# Patient Record
Sex: FEMALE | Race: OTHER | Employment: PART TIME | URBAN - METROPOLITAN AREA
[De-identification: names, ages, dates, MRNs, and addresses within clinical notes are randomized per-mention and may not be internally consistent; named-entity substitution may affect disease eponyms.]

---

## 2018-04-14 ENCOUNTER — HOSPITAL ENCOUNTER (EMERGENCY)
Age: 43
Discharge: HOME | End: 2018-04-14
Attending: EMERGENCY MEDICINE
Payer: SELF-PAY

## 2018-04-14 ENCOUNTER — APPOINTMENT (OUTPATIENT)
Dept: GENERAL RADIOLOGY | Age: 43
End: 2018-04-14
Attending: NURSE PRACTITIONER
Payer: SELF-PAY

## 2018-04-14 ENCOUNTER — APPOINTMENT (OUTPATIENT)
Dept: CT IMAGING | Age: 43
End: 2018-04-14
Attending: PHYSICIAN ASSISTANT
Payer: SELF-PAY

## 2018-04-14 VITALS
RESPIRATION RATE: 11 BRPM | SYSTOLIC BLOOD PRESSURE: 125 MMHG | OXYGEN SATURATION: 99 % | WEIGHT: 189.6 LBS | HEIGHT: 67 IN | DIASTOLIC BLOOD PRESSURE: 82 MMHG | BODY MASS INDEX: 29.76 KG/M2 | HEART RATE: 73 BPM | TEMPERATURE: 97.8 F

## 2018-04-14 DIAGNOSIS — S46.912A STRAIN OF LEFT SHOULDER, INITIAL ENCOUNTER: ICD-10-CM

## 2018-04-14 DIAGNOSIS — N39.0 URINARY TRACT INFECTION WITHOUT HEMATURIA, SITE UNSPECIFIED: Primary | ICD-10-CM

## 2018-04-14 LAB
ANION GAP SERPL CALC-SCNC: 7 MMOL/L (ref 3–18)
APPEARANCE UR: CLEAR
BACTERIA URNS QL MICRO: ABNORMAL /HPF
BASOPHILS # BLD: 0 K/UL (ref 0–0.06)
BASOPHILS NFR BLD: 1 % (ref 0–2)
BILIRUB UR QL: NEGATIVE
BUN SERPL-MCNC: 10 MG/DL (ref 7–18)
BUN/CREAT SERPL: 13 (ref 12–20)
CALCIUM SERPL-MCNC: 7.7 MG/DL (ref 8.5–10.1)
CHLORIDE SERPL-SCNC: 111 MMOL/L (ref 100–108)
CK MB CFR SERPL CALC: NORMAL % (ref 0–4)
CK MB SERPL-MCNC: <1 NG/ML (ref 5–25)
CK SERPL-CCNC: 83 U/L (ref 26–192)
CO2 SERPL-SCNC: 26 MMOL/L (ref 21–32)
COLOR UR: YELLOW
CREAT SERPL-MCNC: 0.79 MG/DL (ref 0.6–1.3)
DIFFERENTIAL METHOD BLD: ABNORMAL
EOSINOPHIL # BLD: 0.1 K/UL (ref 0–0.4)
EOSINOPHIL NFR BLD: 2 % (ref 0–5)
EPITH CASTS URNS QL MICRO: ABNORMAL /LPF (ref 0–5)
ERYTHROCYTE [DISTWIDTH] IN BLOOD BY AUTOMATED COUNT: 14.4 % (ref 11.6–14.5)
GLUCOSE SERPL-MCNC: 87 MG/DL (ref 74–99)
GLUCOSE UR STRIP.AUTO-MCNC: NEGATIVE MG/DL
HCG UR QL: NEGATIVE
HCT VFR BLD AUTO: 37.4 % (ref 35–45)
HGB BLD-MCNC: 11.9 G/DL (ref 12–16)
HGB UR QL STRIP: ABNORMAL
KETONES UR QL STRIP.AUTO: NEGATIVE MG/DL
LEUKOCYTE ESTERASE UR QL STRIP.AUTO: ABNORMAL
LYMPHOCYTES # BLD: 1.1 K/UL (ref 0.9–3.6)
LYMPHOCYTES NFR BLD: 27 % (ref 21–52)
MCH RBC QN AUTO: 26.7 PG (ref 24–34)
MCHC RBC AUTO-ENTMCNC: 31.8 G/DL (ref 31–37)
MCV RBC AUTO: 83.9 FL (ref 74–97)
MONOCYTES # BLD: 0.2 K/UL (ref 0.05–1.2)
MONOCYTES NFR BLD: 5 % (ref 3–10)
NEUTS SEG # BLD: 2.8 K/UL (ref 1.8–8)
NEUTS SEG NFR BLD: 65 % (ref 40–73)
NITRITE UR QL STRIP.AUTO: NEGATIVE
PH UR STRIP: 8 [PH] (ref 5–8)
PLATELET # BLD AUTO: 266 K/UL (ref 135–420)
PMV BLD AUTO: 10.4 FL (ref 9.2–11.8)
POTASSIUM SERPL-SCNC: 4.1 MMOL/L (ref 3.5–5.5)
PROT UR STRIP-MCNC: NEGATIVE MG/DL
RBC # BLD AUTO: 4.46 M/UL (ref 4.2–5.3)
RBC #/AREA URNS HPF: >100 /HPF (ref 0–5)
SODIUM SERPL-SCNC: 144 MMOL/L (ref 136–145)
SP GR UR REFRACTOMETRY: 1.02 (ref 1–1.03)
TROPONIN I SERPL-MCNC: <0.02 NG/ML (ref 0–0.04)
UROBILINOGEN UR QL STRIP.AUTO: 0.2 EU/DL (ref 0.2–1)
WBC # BLD AUTO: 4.2 K/UL (ref 4.6–13.2)
WBC URNS QL MICRO: ABNORMAL /HPF (ref 0–4)

## 2018-04-14 PROCEDURE — 81025 URINE PREGNANCY TEST: CPT | Performed by: PHYSICIAN ASSISTANT

## 2018-04-14 PROCEDURE — 74177 CT ABD & PELVIS W/CONTRAST: CPT

## 2018-04-14 PROCEDURE — 93005 ELECTROCARDIOGRAM TRACING: CPT

## 2018-04-14 PROCEDURE — 99284 EMERGENCY DEPT VISIT MOD MDM: CPT

## 2018-04-14 PROCEDURE — 74011636320 HC RX REV CODE- 636/320: Performed by: EMERGENCY MEDICINE

## 2018-04-14 PROCEDURE — 80048 BASIC METABOLIC PNL TOTAL CA: CPT | Performed by: NURSE PRACTITIONER

## 2018-04-14 PROCEDURE — 81001 URINALYSIS AUTO W/SCOPE: CPT | Performed by: NURSE PRACTITIONER

## 2018-04-14 PROCEDURE — 82550 ASSAY OF CK (CPK): CPT | Performed by: NURSE PRACTITIONER

## 2018-04-14 PROCEDURE — 85025 COMPLETE CBC W/AUTO DIFF WBC: CPT | Performed by: NURSE PRACTITIONER

## 2018-04-14 PROCEDURE — 71045 X-RAY EXAM CHEST 1 VIEW: CPT

## 2018-04-14 RX ORDER — PHENAZOPYRIDINE HYDROCHLORIDE 200 MG/1
200 TABLET, FILM COATED ORAL 3 TIMES DAILY
Qty: 6 TAB | Refills: 0 | Status: SHIPPED | OUTPATIENT
Start: 2018-04-14 | End: 2018-04-16

## 2018-04-14 RX ORDER — CYCLOBENZAPRINE HCL 10 MG
10 TABLET ORAL
Qty: 30 TAB | Refills: 0 | Status: SHIPPED | OUTPATIENT
Start: 2018-04-14 | End: 2018-06-26

## 2018-04-14 RX ORDER — NAPROXEN 500 MG/1
500 TABLET ORAL 2 TIMES DAILY WITH MEALS
Qty: 20 TAB | Refills: 0 | Status: SHIPPED | OUTPATIENT
Start: 2018-04-14 | End: 2018-04-24

## 2018-04-14 RX ORDER — NITROFURANTOIN 25; 75 MG/1; MG/1
100 CAPSULE ORAL 2 TIMES DAILY
Qty: 14 CAP | Refills: 0 | Status: SHIPPED | OUTPATIENT
Start: 2018-04-14 | End: 2018-04-21

## 2018-04-14 RX ADMIN — IOPAMIDOL 95 ML: 612 INJECTION, SOLUTION INTRAVENOUS at 18:35

## 2018-04-14 NOTE — ED TRIAGE NOTES
Patient reports dizziness, abdominal and flank pain, for about 1 week. Patient stated she also feels numbness on her shoulder and chest pain for 1 week as well.

## 2018-04-14 NOTE — ED PROVIDER NOTES
EMERGENCY DEPARTMENT HISTORY AND PHYSICAL EXAM    6:14 PM      Date: 4/14/2018  Patient Name: Monica Burleson    History of Presenting Illness     Chief Complaint   Patient presents with    Dizziness    Abdominal Pain    Chest Pain    Flank Pain         History Provided By: Patient, Nurse     Chief Complaint: abdominal pain  Duration:  Days  Timing:  Constant  Location: lower abdomen  Quality: Aching and Sharp  Severity: Moderate  Modifying Factors: has not taken any medication for relief  Associated Symptoms: flank pain, shoudler pain, chest pain      Additional History (Context): Monica Burleson is a 37 y.o. female with No significant past medical history and non smoker who presents with abdominal pain for the past week.  has sharp stabbing pain. Has not taken any meds for relief. Mild nausea but no vomiting.  has had a fever.  has also had shoulder and upper left side chest pain for the past week. Made worse with movement and lifting. No pain at rest. Denies trauma. Denies SOB. Had mild dizziness but has resolved. [place] PCP: None        Past History     Past Medical History:  History reviewed. No pertinent past medical history. Past Surgical History:  History reviewed. No pertinent surgical history. Family History:  History reviewed. No pertinent family history. Social History:  Social History   Substance Use Topics    Smoking status: Never Smoker    Smokeless tobacco: Never Used    Alcohol use None       Allergies:  No Known Allergies      Review of Systems     Constitutional:  fever, chills. Head:  Denies injury. Face:  Denies injury or pain. ENMT: Ear pain  Denies sore throat. Neck:  Denies injury or pain. Chest:  Pain  Cardiac:  Denies chest pain or palpitations. Respiratory:  Denies cough, wheezing, difficulty breathing, shortness of breath. GI/ABD:  pain,  nausea, Denies vomiting, diarrhea.    :  Denies injury, pain, dysuria or urgency. Back:  Denies injury or pain. Pelvis:  Denies injury or pain. Extremity/MS:  Shoulder pain  Neuro:   Dizziness, Denies headache, LOC, neurologic symptoms/deficits/paresthesias. Skin: Denies injury, rash, itching or skin changes. Physical Exam     Visit Vitals    /82    Pulse 75    Temp 97.8 °F (36.6 °C)    Resp 17    Ht 5' 7\" (1.702 m)    Wt 86 kg (189 lb 9.6 oz)    SpO2 100%    BMI 29.7 kg/m2       CONSTITUTIONAL: Alert, in no apparent distress; well-developed; well-nourished. HEAD:  Normocephalic, atraumatic. EYES: PERRL; EOM's intact. ENTM: Nose: No rhinorrhea; Throat: mucous membranes moist. Posterior pharynx-normal. Bilateral AC mildly erythematous, TM's shinny  Neck:  No JVD, supple without lymphadenopathy. CHEST: TTP over left pectoralis. RESP: Chest clear, equal breath sounds. CV: S1 and S2 WNL; No murmurs, gallops or rubs. GI: Abdomen soft and RLQ tenderness, +BS, NG, NR  No masses or organomegaly. UPPER EXT:  Normal inspection. Left shoulder FROM, 5/5 strength, mild tenderness over bicipital groove, no erythema, edema, or ecchymosis  LOWER EXT: Normal inspection. NEURO: strength 5/5 and sym, sensation intact. SKIN: No rashes; Normal for age and stage. PSYCH:  Alert and oriented, normal affect.         Diagnostic Study Results     Labs -  Recent Results (from the past 12 hour(s))   URINALYSIS W/ RFLX MICROSCOPIC    Collection Time: 04/14/18  5:21 PM   Result Value Ref Range    Color YELLOW      Appearance CLEAR      Specific gravity 1.020 1.005 - 1.030      pH (UA) 8.0 5.0 - 8.0      Protein NEGATIVE  NEG mg/dL    Glucose NEGATIVE  NEG mg/dL    Ketone NEGATIVE  NEG mg/dL    Bilirubin NEGATIVE  NEG      Blood LARGE (A) NEG      Urobilinogen 0.2 0.2 - 1.0 EU/dL    Nitrites NEGATIVE  NEG      Leukocyte Esterase SMALL (A) NEG     HCG URINE, QL    Collection Time: 04/14/18  5:21 PM   Result Value Ref Range    HCG urine, QL NEGATIVE  NEG     URINE MICROSCOPIC ONLY    Collection Time: 04/14/18  5:21 PM   Result Value Ref Range    WBC 11 to 20 0 - 4 /hpf    RBC >100 (H) 0 - 5 /hpf    Epithelial cells 2+ 0 - 5 /lpf    Bacteria 1+ (A) NEG /hpf   CBC WITH AUTOMATED DIFF    Collection Time: 04/14/18  5:34 PM   Result Value Ref Range    WBC 4.2 (L) 4.6 - 13.2 K/uL    RBC 4.46 4.20 - 5.30 M/uL    HGB 11.9 (L) 12.0 - 16.0 g/dL    HCT 37.4 35.0 - 45.0 %    MCV 83.9 74.0 - 97.0 FL    MCH 26.7 24.0 - 34.0 PG    MCHC 31.8 31.0 - 37.0 g/dL    RDW 14.4 11.6 - 14.5 %    PLATELET 957 067 - 686 K/uL    MPV 10.4 9.2 - 11.8 FL    NEUTROPHILS 65 40 - 73 %    LYMPHOCYTES 27 21 - 52 %    MONOCYTES 5 3 - 10 %    EOSINOPHILS 2 0 - 5 %    BASOPHILS 1 0 - 2 %    ABS. NEUTROPHILS 2.8 1.8 - 8.0 K/UL    ABS. LYMPHOCYTES 1.1 0.9 - 3.6 K/UL    ABS. MONOCYTES 0.2 0.05 - 1.2 K/UL    ABS. EOSINOPHILS 0.1 0.0 - 0.4 K/UL    ABS. BASOPHILS 0.0 0.0 - 0.06 K/UL    DF AUTOMATED     METABOLIC PANEL, BASIC    Collection Time: 04/14/18  5:34 PM   Result Value Ref Range    Sodium 144 136 - 145 mmol/L    Potassium 4.1 3.5 - 5.5 mmol/L    Chloride 111 (H) 100 - 108 mmol/L    CO2 26 21 - 32 mmol/L    Anion gap 7 3.0 - 18 mmol/L    Glucose 87 74 - 99 mg/dL    BUN 10 7.0 - 18 MG/DL    Creatinine 0.79 0.6 - 1.3 MG/DL    BUN/Creatinine ratio 13 12 - 20      GFR est AA >60 >60 ml/min/1.73m2    GFR est non-AA >60 >60 ml/min/1.73m2    Calcium 7.7 (L) 8.5 - 10.1 MG/DL   CARDIAC PANEL,(CK, CKMB & TROPONIN)    Collection Time: 04/14/18  5:34 PM   Result Value Ref Range    CK 83 26 - 192 U/L    CK - MB <1.0 <3.6 ng/ml    CK-MB Index  0.0 - 4.0 %     CALCULATION NOT PERFORMED WHEN RESULT IS BELOW LINEAR LIMIT    Troponin-I, Qt. <0.02 0.0 - 0.045 NG/ML       Radiologic Studies -   XR CHEST PORT   Final Result      CT ABD PELV W CONT    (Results Pending)         Medical Decision Making   I am the first provider for this patient.     I reviewed the vital signs, available nursing notes, past medical history, past surgical history, family history and social history. Vital Signs-Reviewed the patient's vital signs. Pulse Oximetry Analysis -  100 on room air (Interpretation)wnl      Records Reviewed: Nursing Notes and Old Medical Records (Time of Review: 6:14 PM)    ED Course: Progress Notes, Reevaluation, and Consults:      Provider Notes (Medical Decision Making):   DDx: gastroenteritis, GERD, hernia, hepatitis, pancreatitis, gallbladder etiology, constipation, adhesions, UTI, pyelo, kidney stones, STD,  Cwyr-Fikg-Aqeljc syndrome, preg, ectopic, ovarian cyst, ovarian torsion, tubo-ovarian abscess, appendicitis, diverticulitis, SBO, GI bleed, mesenteric ischemia, AAA, cardiac etiology, musculoskeletal pain/spasm, malignancy  IMPRESSION AND MEDICAL DECISION MAKING:  Based upon the patient's presentation with noted HPI and PE, along with the work up done in the emergency department, I believe that the patient has a UTI. Do not believe pyelonephritis. Pt also has a shoulder strain. Will treat and have her follow up with her PCP. The patient will be discharged home. Warning signs of worsening condition were discussed and understood by the patient. Based on patient's age, coexisting illness, exam, and the results of this ED evaluation, the decision to treat as an outpatient was made. Based on the information available at time of discharge, acute pathology requiring immediate intervention was deemed relative unlikely. While it is impossible to completely exclude the possibility of underlying serious disease or worsening of condition, I feel the relative likelihood is extremely low. I discussed this uncertainty with the patient, who understood ED evaluation and treatment and felt comfortable with the outpatient treatment plan. All questions regarding care, test results, and follow up were answered. The patient is stable and appropriate to discharge.  They understand that they should return to the emergency department for any new or worsening symptoms. I stressed the importance of follow up for repeat assessment and possibly further evaluation/treatment. Diagnosis     Clinical Impression:   1. Urinary tract infection without hematuria, site unspecified    2.  Strain of left shoulder, initial encounter      _______________________________

## 2018-04-14 NOTE — DISCHARGE INSTRUCTIONS
Infección urinaria en las mujeres: Instrucciones de cuidado - [ Urinary Tract Infection in Women: Care Instructions ]  Instrucciones de cuidado    James infección urinaria (UTI, por roxann siglas en inglés) es un término general que hace referencia a james infección que se produce en cualquier parte entre los riñones y la uretra (conducto por el cual se expulsa la orina). La mayoría de las UTI son infecciones de la vejiga. Con frecuencia, causan dolor o ardor al MichaelSherie. Las UTI son causadas por bacterias y pueden curarse con antibióticos. Asegúrese de completar el tratamiento para que la infección desaparezca. La atención de seguimiento es james parte clave de mondragon tratamiento y seguridad. Asegúrese de hacer y acudir a todas las citas, y llame a mondragon médico si está teniendo problemas. También es james buena idea saber los resultados de los exámenes y mantener james lista de los medicamentos que thom. ¿Cómo puede cuidarse en el hogar? · [address] No deje de tomarlos por el hecho de sentirse mejor. Debe amilcar todos los antibióticos hasta terminarlos. · Connor los próximos yarelis o [address], jessica mayor cantidad de [place] y otros líquidos. [place] puede ayudar a eliminar las bacterias que provocan la infección. (Si tiene james enfermedad de los riñones, el corazón o el hígado y tiene que [place] líquidos, hable con mondragon médico antes de aumentar mondragon consumo). · Evite las bebidas gaseosas o con cafeína. Pueden irritar la vejiga. · Orine con frecuencia. Trate de vaciar la vejiga cada vez que orine. · Para aliviar el dolor, tome un baño caliente o colóquese james almohadilla térmica a baja temperatura sobre la parte baja del abdomen o la roberto genital. Nunca se duerma mientras [place] james almohadilla térmica. Para prevenir las infecciones urinarias  · Jessica abundante agua todos los días. [place] la ayuda a orinar con frecuencia, lo que elimina las bacterias de mondragon organismo.  (Si tiene [employer] riñones, el corazón o el hígado y tiene que Charis's líquidos, hable con mondragon médico antes de aumentar mondragon consumo). · Orine cuando necesite hacerlo. · Orine inmediatamente después de lior tenido [employer]. · Cámbiese las toallas sanitarias con frecuencia. · Evite el uso de lavados vaginales, los darcy de burbujas, los [place] de higiene femenina y otros productos para la higiene femenina que contengan desodorantes. · Después de ir al baño, límpiese de adelante hacia atrás. ¿Cuándo debes pedir ayuda? Llama a tu médico ahora mismo o busca atención médica inmediata si:  ? · Aparecen síntomas james fiebre, escalofríos, náuseas o vómitos por Elsy Cooper, o empeoran. ? · Te empieza a doler la espalda, judy debajo de la caja torácica. A esto se le llama dolor en el flanco.   ? · Aparece maurice o pus en la orina. ? · Tienes problemas con los antibióticos. ?Presta especial atención a los cambios en tu jordy y asegúrate de comunicarte con tu médico si:  ? · No mejoras después de lior tomado un antibiótico az[place] 2 días. ? · Los síntomas desaparecen y Hoang Molt. ¿Dónde puede encontrar más información en inglés? Rahul Murillo a [email or web address]-[email or web address].[email or web address] Og Tellez Y821 en la búsqueda para aprender más acerca de \"Infección urinaria en las mujeres: Instrucciones de cuidado - [ Urinary Tract Infection in Women: Care Instructions ]. \"  Revisado: [address] [place], 2017  Versión del contenido: 11.4  © 1125-7607 [employer]. Las instrucciones de cuidado fueron adaptadas bajo licencia por [employer] (which disclaims liability or warranty for this information). Si usted tiene [place]ulbert afección médica o sobre estas instrucciones, siempre pregunte a mondragon profesional de jordy. [facility] niega toda garantía o responsabilidad por mondragon uso de esta información. I have reviewed discharge instructions with the patient. The patient verbalized understanding. Discharge instructions in Yi per staff.

## 2018-04-15 LAB
ATRIAL RATE: 68 BPM
CALCULATED P AXIS, ECG09: 25 DEGREES
CALCULATED R AXIS, ECG10: 36 DEGREES
CALCULATED T AXIS, ECG11: 47 DEGREES
DIAGNOSIS, 93000: NORMAL
P-R INTERVAL, ECG05: 148 MS
Q-T INTERVAL, ECG07: 406 MS
QRS DURATION, ECG06: 82 MS
QTC CALCULATION (BEZET), ECG08: 431 MS
VENTRICULAR RATE, ECG03: 68 BPM

## 2018-05-30 ENCOUNTER — HOSPITAL ENCOUNTER (EMERGENCY)
Age: 43
Discharge: HOME | End: 2018-05-30
Attending: EMERGENCY MEDICINE
Payer: SELF-PAY

## 2018-05-30 ENCOUNTER — APPOINTMENT (OUTPATIENT)
Dept: CT IMAGING | Age: 43
End: 2018-05-30
Attending: PHYSICIAN ASSISTANT
Payer: SELF-PAY

## 2018-05-30 VITALS
TEMPERATURE: 98.2 F | SYSTOLIC BLOOD PRESSURE: 137 MMHG | RESPIRATION RATE: 16 BRPM | DIASTOLIC BLOOD PRESSURE: 94 MMHG | OXYGEN SATURATION: 100 % | HEART RATE: 80 BPM

## 2018-05-30 DIAGNOSIS — R51.9 NONINTRACTABLE EPISODIC HEADACHE, UNSPECIFIED HEADACHE TYPE: ICD-10-CM

## 2018-05-30 DIAGNOSIS — R03.0 ELEVATED BLOOD PRESSURE READING: ICD-10-CM

## 2018-05-30 DIAGNOSIS — R10.84 ABDOMINAL PAIN, GENERALIZED: Primary | ICD-10-CM

## 2018-05-30 LAB
ALBUMIN SERPL-MCNC: 4.2 G/DL (ref 3.4–5)
ALBUMIN/GLOB SERPL: 1.1 {RATIO} (ref 0.8–1.7)
ALP SERPL-CCNC: 116 U/L (ref 45–117)
ALT SERPL-CCNC: 22 U/L (ref 13–56)
AMORPH CRY URNS QL MICRO: ABNORMAL
ANION GAP SERPL CALC-SCNC: 6 MMOL/L (ref 3–18)
APPEARANCE UR: ABNORMAL
AST SERPL-CCNC: 18 U/L (ref 15–37)
BACTERIA URNS QL MICRO: NEGATIVE /HPF
BASOPHILS # BLD: 0 K/UL (ref 0–0.06)
BASOPHILS NFR BLD: 1 % (ref 0–2)
BILIRUB SERPL-MCNC: 0.6 MG/DL (ref 0.2–1)
BILIRUB UR QL: NEGATIVE
BUN SERPL-MCNC: 10 MG/DL (ref 7–18)
BUN/CREAT SERPL: 14 (ref 12–20)
CALCIUM SERPL-MCNC: 8.7 MG/DL (ref 8.5–10.1)
CHLORIDE SERPL-SCNC: 107 MMOL/L (ref 100–108)
CO2 SERPL-SCNC: 29 MMOL/L (ref 21–32)
COLOR UR: YELLOW
CREAT SERPL-MCNC: 0.72 MG/DL (ref 0.6–1.3)
DIFFERENTIAL METHOD BLD: ABNORMAL
EOSINOPHIL # BLD: 0.1 K/UL (ref 0–0.4)
EOSINOPHIL NFR BLD: 1 % (ref 0–5)
EPITH CASTS URNS QL MICRO: ABNORMAL /LPF (ref 0–5)
ERYTHROCYTE [DISTWIDTH] IN BLOOD BY AUTOMATED COUNT: 14.9 % (ref 11.6–14.5)
GLOBULIN SER CALC-MCNC: 3.7 G/DL (ref 2–4)
GLUCOSE SERPL-MCNC: 105 MG/DL (ref 74–99)
GLUCOSE UR STRIP.AUTO-MCNC: NEGATIVE MG/DL
HCG UR QL: NEGATIVE
HCT VFR BLD AUTO: 41 % (ref 35–45)
HGB BLD-MCNC: 13.7 G/DL (ref 12–16)
HGB UR QL STRIP: NEGATIVE
KETONES UR QL STRIP.AUTO: NEGATIVE MG/DL
LEUKOCYTE ESTERASE UR QL STRIP.AUTO: ABNORMAL
LIPASE SERPL-CCNC: 97 U/L (ref 73–393)
LYMPHOCYTES # BLD: 1 K/UL (ref 0.9–3.6)
LYMPHOCYTES NFR BLD: 16 % (ref 21–52)
MCH RBC QN AUTO: 27.7 PG (ref 24–34)
MCHC RBC AUTO-ENTMCNC: 33.4 G/DL (ref 31–37)
MCV RBC AUTO: 83 FL (ref 74–97)
MONOCYTES # BLD: 0.4 K/UL (ref 0.05–1.2)
MONOCYTES NFR BLD: 6 % (ref 3–10)
NEUTS SEG # BLD: 5.1 K/UL (ref 1.8–8)
NEUTS SEG NFR BLD: 76 % (ref 40–73)
NITRITE UR QL STRIP.AUTO: NEGATIVE
PH UR STRIP: >8.5 [PH] (ref 5–8)
PLATELET # BLD AUTO: 287 K/UL (ref 135–420)
PMV BLD AUTO: 10.4 FL (ref 9.2–11.8)
POTASSIUM SERPL-SCNC: 4 MMOL/L (ref 3.5–5.5)
PROT SERPL-MCNC: 7.9 G/DL (ref 6.4–8.2)
PROT UR STRIP-MCNC: 30 MG/DL
RBC # BLD AUTO: 4.94 M/UL (ref 4.2–5.3)
RBC #/AREA URNS HPF: 0 /HPF (ref 0–5)
SODIUM SERPL-SCNC: 142 MMOL/L (ref 136–145)
SP GR UR REFRACTOMETRY: 1.02 (ref 1–1.03)
UROBILINOGEN UR QL STRIP.AUTO: 0.2 EU/DL (ref 0.2–1)
WBC # BLD AUTO: 6.6 K/UL (ref 4.6–13.2)
WBC URNS QL MICRO: ABNORMAL /HPF (ref 0–4)

## 2018-05-30 PROCEDURE — 81001 URINALYSIS AUTO W/SCOPE: CPT | Performed by: PHYSICIAN ASSISTANT

## 2018-05-30 PROCEDURE — 96374 THER/PROPH/DIAG INJ IV PUSH: CPT

## 2018-05-30 PROCEDURE — 96375 TX/PRO/DX INJ NEW DRUG ADDON: CPT

## 2018-05-30 PROCEDURE — 74177 CT ABD & PELVIS W/CONTRAST: CPT

## 2018-05-30 PROCEDURE — 81025 URINE PREGNANCY TEST: CPT | Performed by: PHYSICIAN ASSISTANT

## 2018-05-30 PROCEDURE — 83690 ASSAY OF LIPASE: CPT | Performed by: PHYSICIAN ASSISTANT

## 2018-05-30 PROCEDURE — 74011250636 HC RX REV CODE- 250/636: Performed by: PHYSICIAN ASSISTANT

## 2018-05-30 PROCEDURE — 99283 EMERGENCY DEPT VISIT LOW MDM: CPT

## 2018-05-30 PROCEDURE — 74011250637 HC RX REV CODE- 250/637: Performed by: EMERGENCY MEDICINE

## 2018-05-30 PROCEDURE — 74011636320 HC RX REV CODE- 636/320: Performed by: EMERGENCY MEDICINE

## 2018-05-30 PROCEDURE — 80053 COMPREHEN METABOLIC PANEL: CPT | Performed by: PHYSICIAN ASSISTANT

## 2018-05-30 PROCEDURE — 96361 HYDRATE IV INFUSION ADD-ON: CPT

## 2018-05-30 PROCEDURE — 74011000250 HC RX REV CODE- 250: Performed by: EMERGENCY MEDICINE

## 2018-05-30 PROCEDURE — 85025 COMPLETE CBC W/AUTO DIFF WBC: CPT | Performed by: PHYSICIAN ASSISTANT

## 2018-05-30 RX ORDER — LIDOCAINE HYDROCHLORIDE 20 MG/ML
15 SOLUTION OROPHARYNGEAL AS NEEDED
Status: DISCONTINUED | OUTPATIENT
Start: 2018-05-30 | End: 2018-05-30

## 2018-05-30 RX ORDER — HYDROMORPHONE HYDROCHLORIDE 2 MG/ML
1 INJECTION, SOLUTION INTRAMUSCULAR; INTRAVENOUS; SUBCUTANEOUS ONCE
Status: COMPLETED | OUTPATIENT
Start: 2018-05-30 | End: 2018-05-30

## 2018-05-30 RX ORDER — KETOROLAC TROMETHAMINE 30 MG/ML
30 INJECTION, SOLUTION INTRAMUSCULAR; INTRAVENOUS
Status: COMPLETED | OUTPATIENT
Start: 2018-05-30 | End: 2018-05-30

## 2018-05-30 RX ORDER — ONDANSETRON 2 MG/ML
4 INJECTION INTRAMUSCULAR; INTRAVENOUS
Status: COMPLETED | OUTPATIENT
Start: 2018-05-30 | End: 2018-05-30

## 2018-05-30 RX ORDER — METOCLOPRAMIDE HYDROCHLORIDE 5 MG/ML
10 INJECTION INTRAMUSCULAR; INTRAVENOUS
Status: COMPLETED | OUTPATIENT
Start: 2018-05-30 | End: 2018-05-30

## 2018-05-30 RX ORDER — DIPHENHYDRAMINE HYDROCHLORIDE 50 MG/ML
25 INJECTION, SOLUTION INTRAMUSCULAR; INTRAVENOUS ONCE
Status: COMPLETED | OUTPATIENT
Start: 2018-05-30 | End: 2018-05-30

## 2018-05-30 RX ADMIN — KETOROLAC TROMETHAMINE 30 MG: 30 INJECTION, SOLUTION INTRAMUSCULAR at 17:31

## 2018-05-30 RX ADMIN — METOCLOPRAMIDE 10 MG: 5 INJECTION, SOLUTION INTRAMUSCULAR; INTRAVENOUS at 17:31

## 2018-05-30 RX ADMIN — IOPAMIDOL 100 ML: 612 INJECTION, SOLUTION INTRAVENOUS at 15:43

## 2018-05-30 RX ADMIN — ONDANSETRON 4 MG: 2 INJECTION INTRAMUSCULAR; INTRAVENOUS at 14:52

## 2018-05-30 RX ADMIN — HYDROMORPHONE HYDROCHLORIDE 1 MG: 2 INJECTION INTRAMUSCULAR; INTRAVENOUS; SUBCUTANEOUS at 14:52

## 2018-05-30 RX ADMIN — SODIUM CHLORIDE 1000 ML: 900 INJECTION, SOLUTION INTRAVENOUS at 14:52

## 2018-05-30 RX ADMIN — PHENOBARBITAL, HYOSCYAMINE SULFATE, ATROPINE SULFATE, SCOPOLAMINE HYDROBROMIDE 50 ML: 16.2; .1037; .0194; .0065 ELIXIR ORAL at 16:39

## 2018-05-30 RX ADMIN — DIPHENHYDRAMINE HYDROCHLORIDE 25 MG: 50 INJECTION, SOLUTION INTRAMUSCULAR; INTRAVENOUS at 17:31

## 2018-05-30 NOTE — DISCHARGE INSTRUCTIONS
Presión arterial elevada: Instrucciones de cuidado - [ Elevated Blood Pressure: Care Instructions ]  Instrucciones de cuidado    La presión arterial es james medida de la fuerza que ejerce la maurice contra las campos de las arterias. Es normal que la presión arterial suba y baje a lo jus del día. Jai si se mantiene alize por un tiempo, usted tiene presión arterial alize. Dos números indican mondragon presión arterial. El primer número es la presión sistólica. Muestra qué tan dolores presiona la maurice cuando el corazón está bombeando. El marshall número es la presión diastólica. Muestra qué tan dolores presiona la [employer] latidos, cuando el corazón está relajado y llenándose de [place]. Artist Shady arterial ideal para adultos es menos de 120/80 (diga \"120 sobre 80\"). La presión arterial alize es de 140/90 o superior. Usted tiene la presión arterial alize si el número de [place] es 140 o superior o el número de abajo es 90 o superior, o ambas cosas. La prueba principal para la presión arterial alize es simple, rápida e indolora. Para diagnosticar presión arterial alize, mondragon médico examinará mondragon presión arterial en momentos diferentes. Después de tomarle la presión, es posible que mondragon médico le pida que se vuelva a amilcar la presión en casa. Si se le diagnostica presión arterial alize, puede colaborar con mondragon médico para elaborar un plan a jus plazo para manejarla. La atención de seguimiento es james parte clave de mondragon tratamiento y seguridad. Asegúrese de hacer y acudir a todas las citas, y llame a mondragon médico si está teniendo problemas. También es james buena idea saber los resultados de los exámenes y mantener james lista de los medicamentos que thom. ¿Cómo puede cuidarse en el hogar? · No fume. Fumar aumenta mondragon riesgo de ataque cerebral y ataque al corazón. Si necesita ayuda para dejarlo, hable con mondragon médico sobre programas y medicamentos para dejar de fumar.  Estos pueden aumentar roxann probabilidades de dejar de fumar para siempre. · Mantenga un peso saludable. · Trate de limitar la cantidad de sodio que ingiere a menos de 2,300 miligramos (mg) al día. Mondragon médico podría pedirle que trate de ingerir menos de 1,500 mg al día. · Manténgase físicamente activo. Elisa al menos 30 minutos de ejercicio la mayoría de los días de la [place]. Caminar es james buena opción. Es posible que también quiera hacer otras actividades, james correr, nadar, [employer], o practicar tenis o deportes de equipo. · No tome alcohol o limite la cantidad que dinora. Hable con mondragon médico acerca de si puede amilcar alcohol. · Coma abundantes frutas, verduras y productos lácteos bajos en grasa. Consuma menos grasa saturada y total.  · Aprenda a revisarse la presión arterial en mondrgaon hogar. ¿Cuándo debe pedir ayuda? Llame a mondragon médico ahora mismo o busque atención médica inmediata si:  ? · Mondragon presión arterial es mucho más alize de lo normal (james 180/110 o superior). ? · Romelia que la presión arterial alize está causando síntomas james:  ¨ Dolor de brianne intenso. [address]. ? Vigile muy de cerca los cambios en mondragon jordy, y asegúrese de comunicarse con mondragon médico si:  ? · No mejora james se esperaba. ¿Dónde puede encontrar más información en inglés? Soledad [place] a [email or web address] Adarsh Alfaro [id number] en la búsqueda para aprender más acerca de \"Presión arterial elevada: Instrucciones de cuidado - [ Elevated Blood Pressure: Care Instructions ]. \"  Revisado: 21 septiembre, 2016  Versión del contenido: 11.4  © 7126-5735 Healthwise, Incorporated. Las instrucciones de cuidado fueron adaptadas bajo licencia por [employer] (which disclaims liability or warranty for this information). Si usted tiene [place] afección médica o sobre estas instrucciones, siempre pregunte a mondragon profesional de jordy. [employer] niega toda garantía o responsabilidad por mondragon uso de esta información. Dolor de brinane:  Instrucciones de cuidado - [ Headache: Care Instructions ]  Instrucciones de cuidado    Los cecilio de brianne tienen muchas causas posibles. La mayoría de los cecilio de brianne no son señal de un problema más estela y mejoran por sí solos. El tratamiento en el [facility] podría ayudarlo a sentirse mejor con Coretta Arnold. El médico lo payton revisado minuciosamente, fabian puede desarrollar problemas más tarde. Si nota algún problema o síntomas, busque tratamiento médico inmediatamente. La atención de seguimiento es james parte clave de mondragon tratamiento y seguridad. Asegúrese de hacer y acudir a todas las citas, y llame a mondragon médico si está teniendo problemas. También es james buena idea saber los resultados de los exámenes y mantener james lista de los medicamentos que thom. ¿Cómo puede cuidarse en el [facility]? · No conduzca si ha tomado analgésicos (medicamentos para el dolor) recetados. · Descanse en un cuarto tranquilo y oscuro hasta que desaparezca el dolor de [place]. Cierre los ojos y trate de relajarse o dormirse. No gonzález la televisión ni abdiaziz. · Colóquese un paño frío y húmedo o Nataliya Miners compresa fría sobre la roberto adolorida de 10 a 21 minutos cada vez. Póngase un paño almanza entre la compresa fría y la piel. · Utilice james toalla húmeda tibia o james almohadilla térmica ajustada a [place]aja temperatura para relajar los músculos tensos del leslee y los hombros.  · Pídale a alguien que le otto masajes suaves en el leslee y los hombros.  · Chauvin los analgésicos exactamente james le fueron indicados. ¨ Si el médico le recetó un analgésico, tómelo según las indicaciones. ¨ Si no está tomando un analgésico recetado, pregúntele a mondragon médico si puede amilcar yarelis de [employer]. · Tenga cuidado de no amilcar analgésicos con mayor frecuencia que la permitida en las indicaciones porque los cecilio de brianne podrían empeorar o aparecer con mayor frecuencia james vez que el medicamento pierda mondragon [place].   · Preste atención a los nuevos síntomas que aparecen con el dolor de brianne, james fiebre, debilidad o entumecimiento, cambios en la visión o confusión. Podrían ser señales de un problema más grave. Para prevenir los ceiclio de brianne  · [employer] un diario de roxann cecilio de brianne para que pueda averiguar qué los desencadena. Evitar los desencadenantes podría ayudar a prevenir los cecilio de [place]. Anote cuándo empieza cada dolor de [place], cuánto dura y cómo es el dolor (palpitante, inge, punzante o sordo). Anote cualquier otro síntoma que haya tenido con el dolor de [place], [place] náuseas, destellos de nuria o ELISABET, o sensibilidad a la nuria brillante o a los ruidos misa. Anote si el dolor de brianne ocurrió cerca de mondragon menstruación. Enumere todos los factores que pudieran lior desencadenado el dolor de [place], james ciertos alimentos (chocolate, queso, vino) u olores, humo, luces brillantes, estrés o falta de sueño. · Encuentre maneras saludables de [facility]. Los cecilio de [place] son más comunes azalia o judy después de un momento estresante. Tómese un tiempo para relajarse antes y después de hacer algo que le haya causado un dolor de brianne en el pasado. · Trate de mantener roxann músculos relajados mediante james buena postura. Revise si tiene [employer] de la Natalie, la fabio, el leslee y los hombros y trate de relajarlos. Cuando se siente en un escritorio, cambie de posición con frecuencia y estírese por 27 segundos cada hora. · Elisa suficiente ejercicio y duerma bastante. · Coma en forma regular y yasmine. Largos períodos sin comer pueden provocar un dolor de brianne. · Regálese un masaje. Algunas personas encuentran que los masajes hechos con regularidad son [place] para aliviar la tensión. · Limite la cafeína. No baljit demasiado café, té ni sodas. Jai no deje de consumir cafeína de repente, porque eso también puede provocarle cecilio de [place].   · Reduzca la tensión en los ojos a causa de la computadora parpadeando con frecuencia y apartando la mirada de la pantalla a menudo. Asegúrese de tener lentes adecuados y de que mondragon monitor esté colocado de manera correcta, james a un brazo de distancia. · Busque ayuda si tiene depresión o ansiedad. Jeaneth cecilio de [place] podrían relacionarse con estas afecciones. El tratamiento puede prevenir los cecilio de [place] y ayudar con los síntomas de ansiedad o depresión. ¿Cuándo debe pedir ayuda? Llame al 911 en cualquier momento que piense que puede necesitar atención de emergencia. Por ejemplo, llame si:  ? · Tiene señales de un ataque cerebral. Estas pueden incluir:  ¨ Parálisis, entumecimiento o debilidad repentinos en la fabio, el brazo o la pierna, sobre todo si ocurre en un solo lado del cuerpo. ¨ Cambios repentinos en la visión. ¨ Dificultades repentinas para hablar. ¨ Confusión repentina o dificultad para comprender frases sencillas. ¨ Problemas repentinos para caminar o mantener el equilibrio. ¨ Dolor de [place] intenso y repentino, distinto de los cecilio de brianne anteriores. ?Llame a mondragon médico ahora mismo o busque atención médica inmediata si:  ? · Tiene un dolor de brianne nuevo o peor. ? · Mondragon dolor de brianne [employer]. ¿Dónde puede encontrar más información en inglés? Jade Beltre a [email or web address] Escriba D459 en la búsqueda para aprender más acerca de \"Dolor de brianne: Instrucciones de cuidado - [ Headache: Care Instructions ]. \"  Revisado: 14 octubre, 2016  Versión del contenido: 11.4  © 7250-6798 Healthwise, Incorporated. Las instrucciones de cuidado fueron adaptadas bajo licencia por Good Help Connections (which disclaims liability or warranty for this information). Si usted tiene [place] afección médica o sobre estas instrucciones, siempre pregunte a mondragon profesional de jordy. [facility], Incorporated niega toda garantía o responsabilidad por mondragon uso de esta información.          Dolor abdominal: Instrucciones de cuidado - [ Abdominal Pain: Care Instructions ]  Instrucciones de cuidado    El dolor abdominal tiene muchas causas posibles. Algunas de ellas no son graves y mejoran por sí solas en unos días. Otras requieren Angela Nora y [place]. Si mondragon dolor continúa o [place], necesitará james nueva revisión y [place] pruebas para determinar qué pasa. Es posible que necesite cirugía para corregir el problema. No ignore nuevos síntomas, james fiebre, náuseas y [place], [facility], dolor que [place] o [place]. Podrían ser señales de un problema más grave. Mondragon médico puede haberle recomendado james consulta de Riccardo Reidn las 8 o 12 horas siguientes. Si no se siente mejor, es posible que requiera Angela Nora o [place]. El médico lo payton revisado minuciosamente, fabian puede lior problemas más tarde. Si nota algún problema o síntomas nuevos, busque tratamiento médico inmediatamente. La atención de seguimiento es james parte clave de mondragon tratamiento y seguridad. Asegúrese de hacer y acudir a todas las citas, y llame a mondragon médico si está teniendo problemas. También es james buena idea saber los resultados de los exámenes y mantener james lista de los medicamentos que thom. ¿Cómo puede cuidarse en el hogar? · Descanse hasta que se sienta mejor. · Para prevenir la deshidratación, baljit abundantes líquidos, suficientes para que mondragon orina sea de color amarillo brandi o transparente james el agua. Elija beber agua y otros líquidos ernestina sin cafeína hasta que se sienta mejor. Si tiene [facility], del corazón o del hígado y tiene que Charis's líquidos, hable con mondragon médico antes de aumentar mondragon consumo. · Si tiene [employer], coma alimentos suaves, james arroz, pan christina seco o galletas saladas, bananas (plátanos) y puré de Synchari. Trate de comer varias comidas pequeñas al día en lugar de dos o chaitanya grandes.   · Espere hasta 48 horas después de que todos los síntomas hayan desaparecido antes de comer alimentos condimentados, alcohol y bebidas que contengan cafeína. · No consuma alimentos ricos en grasa. · Evite medicamentos antiinflamatorios james aspirina, ibuprofeno (Advil, Motrin) y naproxeno (Aleve). Pueden causar [employer]. Dígale a mondraogn médico si está tomando aspirina diariamente debido a otro problema de jordy. ¿Cuándo debe pedir ayuda? Llame al 911 en cualquier momento que considere que necesita atención de emergencia. Por ejemplo, llame si:  ? · Se desmayó (perdió el conocimiento). ? · Las heces son de color rojizo o muy sanguinolentas (con maurice). ? · Vomita maurice o algo parecido a granos de café molido. ? · Tiene dolor abdominal nuevo e intenso. ? Llame a mondragon médico ahora mismo o busque atención médica inmediata si:  ? · Mondragon dolor empeora, sobre todo si se concentra en james luis parte del vientre. ? · Vuelve a tener fiebre o tiene fiebre más alize. ? · Jeaneth heces son negruzcas y parecidas al alquitrán o tienen rastros de [place]. ? · Tiene sangrado vaginal inesperado. ? · Tiene síntomas de james infección del tracto urinario. Estos podrían incluir:  ¨ Dolor al [place]. ¨ Orinar con más frecuencia que lo habitual.  ¨ Maurice en la [place]. ? · [employer] o aturdimiento, o que está a punto de [place]. ?Preste especial atención a los cambios en mondragon jordy y asegúrese de comunicarse con mondragon médico si:  ? · No está mejorando después de 1 día (24 horas). ¿Dónde puede encontrar más información en inglés? Wyvonne Soda a [email or web address] Lizett James E472 en la búsqueda para aprender más acerca de \"Dolor abdominal: Instrucciones de cuidado - [ Abdominal Pain: Care Instructions ]. \"  Revisado: 20 Yuni Mock 2017  Versión del contenido: 11.4  © 8429-0178 Healthwise, [employer]. Las instrucciones de cuidado fueron adaptadas bajo licencia por Good Help Connections (which disclaims liability or warranty for this information).  Si usted tiene [place] afección médica o sobre estas instrucciones, siempre pregunte a mondragon profesional de jordy. [facility], Incorporated niega toda garantía o responsabilidad por mondragon uso de esta información.

## 2018-05-30 NOTE — ED PROVIDER NOTES
EMERGENCY DEPARTMENT HISTORY AND PHYSICAL EXAM    2:52 PM      Date: 2018  Patient Name: Cristóbal Hernandez    History of Presenting Illness     Chief Complaint   Patient presents with    Abdominal Pain       History Provided By: Patient,     Chief Complaint: generalized abdominal pain, worse in the epigastrium  Duration:  Weeks  Timing:  Acute  Location:   Quality: Aching  Severity: Severe  Modifying Factors: no improvement with magnesium  Associated Symptoms: denies any other associated signs or symptoms      Additional History (Context):Elbert Ruano is a 37 y.o. female with a pertinent history of cholecystectomy who presents to the emergency department for evaluation of generalized abdominal pain for one week, worsening since yesterday. No changes in bowel habits, nausea, or vomiting. She relates subjective fevers. History of cholecystectomy in . No urinary problems. She has been taking \"magnesium\" (unsure if citrate or MOM) because her [place] was dirty. \"  However, this only seems to have made matters worse. PCP:  Jose Velásquez NP        Current Outpatient Prescriptions   Medication Sig Dispense Refill    cyclobenzaprine (FLEXERIL) 10 mg tablet Take 1 Tab by mouth three (3) times daily as needed for Muscle Spasm(s). 30 Tab 0    ciprofloxacin HCl (CIPRO) 500 mg tablet Take 1 Tab by mouth two (2) times a day. 14 Tab 0    HYDROcodone-acetaminophen (NORCO) 5-325 mg per tablet Take 1 Tab by mouth every four (4) hours as needed for Pain. Max Daily Amount: 6 Tabs. 8 Tab 0       Past History     Past Medical History:  No past medical history on file. Past Surgical History:  Past Surgical History:   Procedure Laterality Date    HX  SECTION      HX CHOLECYSTECTOMY         Family History:  No family history on file.     Social History:  Social History   Substance Use Topics    Smoking status: Never Smoker    Smokeless tobacco: Never Used    Alcohol use No       Allergies:  No Known Allergies      Review of Systems       Review of Systems   Constitutional: Negative for chills and fever. HENT: Negative for congestion, rhinorrhea and sore throat. Eyes: Negative for photophobia. Respiratory: Negative for cough and shortness of breath. Cardiovascular: Negative for chest pain. Gastrointestinal: Positive for abdominal pain. Negative for blood in stool, constipation, diarrhea, nausea and vomiting. Genitourinary: Negative for dysuria, frequency and hematuria. Musculoskeletal: Negative for back pain and myalgias. Skin: Negative for rash and wound. Neurological: Positive for headaches. Negative for dizziness. All other systems reviewed and are negative. Physical Exam     Visit Vitals    BP (!) 137/94 (BP 1 Location: Right arm, BP Patient Position: At rest)    Pulse 80    Temp 98.2 °F (36.8 °C)    Resp 16    SpO2 100%       Physical Exam   Constitutional: She is oriented to person, place, and time. She appears well-developed and well-nourished. She appears distressed. Tearful, uncomfortable in appearance   HENT:   Head: Normocephalic and atraumatic. Eyes: Conjunctivae are normal.   Neck: Normal range of motion. Neck supple. Cardiovascular: Normal rate, regular rhythm and normal heart sounds. Pulmonary/Chest: Effort normal and breath sounds normal. No respiratory distress. She has no wheezes. She has no rales. She exhibits no tenderness. Abdominal: Soft. Bowel sounds are normal. She exhibits no distension. There is tenderness. There is no rebound and no guarding. Tenderness to light palpation noted to all abdominal regions. No rebound, rigidity, guarding, distention, or mass noted. (-) Bradley's sign. (-) Rovsing's sign. Normoactive BS all four quadrants. Musculoskeletal: She exhibits no edema or deformity. Neurological: She is alert and oriented to person, place, and time. She has normal reflexes.  No cranial nerve deficit. Skin: Skin is warm and dry. She is not diaphoretic. Psychiatric: She has a normal mood and affect. Nursing note and vitals reviewed. Diagnostic Study Results     Labs -  Recent Results (from the past 12 hour(s))   CBC WITH AUTOMATED DIFF    Collection Time: 05/30/18  2:35 PM   Result Value Ref Range    WBC 6.6 4.6 - 13.2 K/uL    RBC 4.94 4.20 - 5.30 M/uL    HGB 13.7 12.0 - 16.0 g/dL    HCT 41.0 35.0 - 45.0 %    MCV 83.0 74.0 - 97.0 FL    MCH 27.7 24.0 - 34.0 PG    MCHC 33.4 31.0 - 37.0 g/dL    RDW 14.9 (H) 11.6 - 14.5 %    PLATELET 040 318 - 963 K/uL    MPV 10.4 9.2 - 11.8 FL    NEUTROPHILS 76 (H) 40 - 73 %    LYMPHOCYTES 16 (L) 21 - 52 %    MONOCYTES 6 3 - 10 %    EOSINOPHILS 1 0 - 5 %    BASOPHILS 1 0 - 2 %    ABS. NEUTROPHILS 5.1 1.8 - 8.0 K/UL    ABS. LYMPHOCYTES 1.0 0.9 - 3.6 K/UL    ABS. MONOCYTES 0.4 0.05 - 1.2 K/UL    ABS. EOSINOPHILS 0.1 0.0 - 0.4 K/UL    ABS. BASOPHILS 0.0 0.0 - 0.06 K/UL    DF AUTOMATED     METABOLIC PANEL, COMPREHENSIVE    Collection Time: 05/30/18  2:35 PM   Result Value Ref Range    Sodium 142 136 - 145 mmol/L    Potassium 4.0 3.5 - 5.5 mmol/L    Chloride 107 100 - 108 mmol/L    CO2 29 21 - 32 mmol/L    Anion gap 6 3.0 - 18 mmol/L    Glucose 105 (H) 74 - 99 mg/dL    BUN 10 7.0 - 18 MG/DL    Creatinine 0.72 0.6 - 1.3 MG/DL    BUN/Creatinine ratio 14 12 - 20      GFR est AA >60 >60 ml/min/1.73m2    GFR est non-AA >60 >60 ml/min/1.73m2    Calcium 8.7 8.5 - 10.1 MG/DL    Bilirubin, total 0.6 0.2 - 1.0 MG/DL    ALT (SGPT) 22 13 - 56 U/L    AST (SGOT) 18 15 - 37 U/L    Alk.  phosphatase 116 45 - 117 U/L    Protein, total 7.9 6.4 - 8.2 g/dL    Albumin 4.2 3.4 - 5.0 g/dL    Globulin 3.7 2.0 - 4.0 g/dL    A-G Ratio 1.1 0.8 - 1.7     LIPASE    Collection Time: 05/30/18  2:35 PM   Result Value Ref Range    Lipase 97 73 - 393 U/L   URINALYSIS W/ RFLX MICROSCOPIC    Collection Time: 05/30/18  2:35 PM   Result Value Ref Range    Color YELLOW      Appearance TURBID      Specific gravity 1.017 1.005 - 1.030      pH (UA) >8.5 (H) 5.0 - 8.0    Protein 30 (A) NEG mg/dL    Glucose NEGATIVE  NEG mg/dL    Ketone NEGATIVE  NEG mg/dL    Bilirubin NEGATIVE  NEG      Blood NEGATIVE  NEG      Urobilinogen 0.2 0.2 - 1.0 EU/dL    Nitrites NEGATIVE  NEG      Leukocyte Esterase TRACE (A) NEG     HCG URINE, QL    Collection Time: 05/30/18  2:35 PM   Result Value Ref Range    HCG urine, QL NEGATIVE  NEG     URINE MICROSCOPIC ONLY    Collection Time: 05/30/18  2:35 PM   Result Value Ref Range    WBC 0 to 2 0 - 4 /hpf    RBC 0 0 - 5 /hpf    Epithelial cells FEW 0 - 5 /lpf    Bacteria NEGATIVE  NEG /hpf    Amorphous Crystals 3+ (A) NEG       Radiologic Studies -   Ct Abd Pelv W Cont    Result Date: 5/30/2018  EXAM: CT of the abdomen and pelvis INDICATION: 45-year-old patient with abdominal pain. COMPARISON: None. TECHNIQUE: Axial CT imaging of the abdomen and pelvis was performed without oral and with intravenous contrast. Multiplanar reformats were generated. One or more dose reduction techniques were used on this CT: automated exposure control, adjustment of the mAs and/or kVp according to patient's size, and iterative reconstruction techniques. The specific techniques utilized on this CT exam have been documented in the patient's electronic medical record. _______________ FINDINGS: LOWER CHEST: Unremarkable. LIVER, BILIARY: Liver is normal. No biliary dilation. The gallbladder is surgically absent. PANCREAS: Normal. SPLEEN: Normal. ADRENALS: Normal. KIDNEYS/URETERS/BLADDER: Renal parenchymal enhancement is symmetric. No hydronephrosis. No nephrolithiasis. No distal ureteral or bladder stone. PELVIC ORGANS: Unremarkable. VASCULATURE: Abdominal aorta is normal in course and caliber. LYMPH NODES: No enlarged lymph nodes. GASTROINTESTINAL TRACT: Stomach, small intestine, and large intestine are normal in course and caliber. No bowel obstruction. No free intraperitoneal gas. Normal appendix.  Liquid stool present throughout the majority of the large intestine. BONES: No acute or aggressive osseous abnormalities identified. OTHER: Small fat-containing umbilical hernia. _______________     IMPRESSION: 1. Liquid so present throughout the majority of the large intestine, as can be seen in the context of diarrheal illness. No bowel obstruction. No free intraperitoneal gas. 2. Normal appendix. 3. Prior cholecystectomy. Medical Decision Making   I am the first provider for this patient. I reviewed the vital signs, available nursing notes, past medical history, past surgical history, family history and social history. Vital Signs-Reviewed the patient's vital signs. Pulse Oximetry Analysis -  100% on room air (Interpretation)    Records Reviewed: Nursing Notes and Old Medical Records (Time of Review: 2:52 PM)    ED Course: Progress Notes, Reevaluation, and Consults:  0515 PM:  Pt care transferred to Yumi Singh PA-C, ED provider. History of patient complaint(s), available diagnostic reports and current treatment plan has been discussed thoroughly. Bedside rounding on patient occured : no . Intended disposition of patient : Home  Pending diagnostics reports and/or labs (please list): pending treatment for migraine. JAIME Luciano      Provider Notes (Medical Decision Making):   Differential Diagnosis: biliary disease, hepatitis, pancreatitis, PUD, gastritis, gastroenteritis, hiatal hernia, constipation, SBO, LBO, mesenteric ischemia/infarction,  ruptured AAA, renal colic, IBS, IBD, Celiac disease, PNA, AMI, PE, splenic abscess/infarction, malignancy, PID([facility])    Plan:  Pt presents ambulatory in distress, tearful, well-hydrated, non-toxic in appearance, with elevated blood pressure and otherwise normal vitals. Benign exam of abdomen with TTP noted diffusely. Unremarkable labs. CT AP reveals fluid throughout intestine. On reassessment, pt states she is still experiencing headache.   She states this feels c/w prior migraines. Will attempt to cover with migraine cocktail. Care turned over to Alta Soto PA-C, [place] [address], [place]     6:05 PM :Pt care assumed from Dr. Clinton Alcazar , ED provider. Pt complaint(s), current treatment plan, progression and available diagnostic results have been discussed thoroughly. Rounding occurred: no  Intended Disposition: Home   Pending diagnostic reports and/or labs (please list): migraine meds effect    6:06 PM  Pt states to me via interpretor all of her symptoms have resolved. Diagnosis     Clinical Impression:   1. Abdominal pain, generalized    2. Nonintractable episodic headache, unspecified headache type    3. Elevated blood pressure reading        Disposition: home    Follow-up Information     Follow up With Details 113 Robin Schwartz, NP On 6/4/2018 at 2:00 pm  [address] [address] [place] [address]  [phone]      Financial/Melissa Application  ask for appplication at appointment on 6/04/18     [facility] EMERGENCY DEPT  If symptoms worsen return immediately [address]  [phone]           Patient's Medications   Start Taking    No medications on file   Continue Taking    CIPROFLOXACIN HCL (CIPRO) 500 MG TABLET    Take 1 Tab by mouth two (2) times a day. CYCLOBENZAPRINE (FLEXERIL) 10 MG TABLET    Take 1 Tab by mouth three (3) times daily as needed for Muscle Spasm(s). HYDROCODONE-ACETAMINOPHEN (NORCO) 5-325 MG PER TABLET    Take 1 Tab by mouth every four (4) hours as needed for Pain. Max Daily Amount: 6 Tabs.    These Medications have changed    No medications on file   Stop Taking    No medications on file     _______________________________

## 2018-06-26 ENCOUNTER — HOSPITAL ENCOUNTER (EMERGENCY)
Age: 43
Discharge: HOME | End: 2018-06-26
Attending: EMERGENCY MEDICINE
Payer: SELF-PAY

## 2018-06-26 VITALS
TEMPERATURE: 97.9 F | BODY MASS INDEX: 27.88 KG/M2 | DIASTOLIC BLOOD PRESSURE: 79 MMHG | WEIGHT: 178 LBS | SYSTOLIC BLOOD PRESSURE: 127 MMHG | OXYGEN SATURATION: 100 % | HEART RATE: 88 BPM | RESPIRATION RATE: 16 BRPM

## 2018-06-26 DIAGNOSIS — N76.4 LABIAL ABSCESS: ICD-10-CM

## 2018-06-26 DIAGNOSIS — N75.1 ABSCESS OF BARTHOLIN'S GLAND: Primary | ICD-10-CM

## 2018-06-26 PROCEDURE — 99283 EMERGENCY DEPT VISIT LOW MDM: CPT

## 2018-06-26 PROCEDURE — 74011250637 HC RX REV CODE- 250/637: Performed by: PHYSICIAN ASSISTANT

## 2018-06-26 PROCEDURE — 75810000289 HC I&D ABSCESS SIMP/COMP/MULT

## 2018-06-26 PROCEDURE — 74011000250 HC RX REV CODE- 250: Performed by: PHYSICIAN ASSISTANT

## 2018-06-26 RX ORDER — LIDOCAINE HYDROCHLORIDE 10 MG/ML
10 INJECTION, SOLUTION EPIDURAL; INFILTRATION; INTRACAUDAL; PERINEURAL ONCE
Status: COMPLETED | OUTPATIENT
Start: 2018-06-26 | End: 2018-06-26

## 2018-06-26 RX ORDER — CLINDAMYCIN HYDROCHLORIDE 300 MG/1
300 CAPSULE ORAL 4 TIMES DAILY
Qty: 40 CAP | Refills: 0 | Status: SHIPPED | OUTPATIENT
Start: 2018-06-26 | End: 2018-07-03

## 2018-06-26 RX ORDER — HYDROCODONE BITARTRATE AND ACETAMINOPHEN 5; 325 MG/1; MG/1
1 TABLET ORAL
Qty: 20 TAB | Refills: 0 | Status: SHIPPED | OUTPATIENT
Start: 2018-06-26 | End: 2018-07-02

## 2018-06-26 RX ORDER — OXYCODONE AND ACETAMINOPHEN 5; 325 MG/1; MG/1
1 TABLET ORAL
Status: COMPLETED | OUTPATIENT
Start: 2018-06-26 | End: 2018-06-26

## 2018-06-26 RX ORDER — CEFIXIME 400 MG/1
400 CAPSULE ORAL DAILY
Qty: 7 CAP | Refills: 0 | Status: SHIPPED | OUTPATIENT
Start: 2018-06-26 | End: 2018-07-03

## 2018-06-26 RX ADMIN — OXYCODONE HYDROCHLORIDE AND ACETAMINOPHEN 1 TABLET: 5; 325 TABLET ORAL at 16:30

## 2018-06-26 RX ADMIN — LIDOCAINE HYDROCHLORIDE 10 ML: 10 INJECTION, SOLUTION EPIDURAL; INFILTRATION; INTRACAUDAL; PERINEURAL at 15:56

## 2018-06-26 NOTE — DISCHARGE INSTRUCTIONS
Absceso cutáneo: Instrucciones de cuidado - [ Skin Abscess: Care Instructions ]  Instrucciones de cuidado    Un absceso de la piel es james infección bacteriana que forma james bolsa de pus. Un forúnculo es james especie de absceso de la piel. Puede que el médico haya hecho james incisión en el absceso para que pueda drenar el pus. Reggie vez tenga james gasa en el surya para que el absceso se mantenga abierto y siga drenando. Sydnee Flack necesite antibióticos. Deberá hacer un seguimiento con mondragon médico para asegurarse de que la infección haya desaparecido. El médico lo payton examinado minuciosamente, fabian pueden desarrollarse problemas más tarde. Si nota algún problema o nuevos síntomas, busque tratamiento médico de inmediato. La atención de seguimiento es james parte clave de mondragon tratamiento y seguridad. Asegúrese de hacer y acudir a todas las citas, y llame a mondragon médico si está teniendo problemas. También es james buena idea saber los resultados de los exámenes y mantener james lista de los medicamentos que thom. ¿Cómo puede cuidarse en el hogar? · Aplíquese compresas calientes y secas, james almohadilla térmica ajustada a baja temperatura o james bolsa de [place] 3 o 4 veces al día para el dolor. Mantenga un paño entre la christopher de calor y la piel. · Si mondragon médico le recetó antibióticos, tómelos según las indicaciones. No deje de tomarlos solo porque se sienta mejor. Debe amilcar todos los antibióticos hasta terminarlos. · [employer] analgésicos (medicamentos para el dolor) exactamente según las indicaciones. ¨ Si el médico le recetó analgésicos, tómelos según las indicaciones. ¨ Si no está tomando un analgésico recetado, pregúntele a mondragon médico si puede amilcar un medicamento de [employer]. · Mantenga la venda limpia y seca. Cambie la venda cuando se moje o se ensucie, o por lo menos james vez al día. · Si el absceso se taponó con gasa:  ¨ Cumpla con las citas de seguimiento para que le cambien o le quiten la gasa.  Si el [employer] indicó que se quitara la gasa, retire toda la gasa suavemente cuando el médico le diga que lo otto. ¨ Después de quitar la gasa, empape la roberto con [place] de 15 a 20 minutos 2 veces al día, hasta que la herida se cierre. ¿Cuándo debe pedir ayuda? Llame a mondragon médico ahora mismo o busque atención médica inmediata si:  ? · Tiene señales de james infección que está empeorando, tales james:  ¨ Aumento del dolor, la hinchazón, la temperatura o el enrojecimiento. ¨ Vetas rojizas que emanan de la piel infectada. ¨ Pus que supura de la herida. Alanis Priscila. ? Vigile de cerca los cambios en mondragon jordy y asegúrese de comunicarse con mondragon médico si:  ? · No mejora james se esperaba. ¿Dónde puede encontrar más información en inglés? Kayla Brar a [email or web address]-shelley.[email or web address] [facility] R304 en la búsqueda para aprender más acerca de \"Absceso cutáneo: Instrucciones de cuidado - [ Skin Abscess: Care Instructions ]. \"  Revisado: 13 octubre, 2016  Versión del contenido: 11.4  © 6577-8717 Healthwise, Incorporated. Las instrucciones de cuidado fueron adaptadas bajo licencia por [employer] (which disclaims liability or warranty for this information). Si usted tiene [place] afección médica o sobre estas instrucciones, siempre pregunte a mondragon profesional de jordy. Healthwise, Incorporated niega toda garantía o responsabilidad por mondragon uso de esta información. Quiste de la glándula de Bartolino: Instrucciones de cuidado - [ Bartholin Gland Cyst: Care Instructions ]  Instrucciones de cuidado    Las glándulas de Bartolino se encuentran en la vulva de Alexis. Esta es la roberto que rodea a la vagina. Las glándulas suelen ser del tamaño de james [place] (chícharo). Le suministran líquido a la roberto vulvar por medio de james abertura pequeña. Si la abertura se obstruye, la glándula se hincha con líquido y se forma un quiste. Puede tener un quiste por varios años sin que se presenten síntomas.  Jai si un quiste se infecta con bacterias, puede crecer, enrojecer y causar dolor. [place] se llama absceso. Por lo general, las infecciones se Jordan Dibbles y [place] [facility]. Es posible que haya tenido puesto un pequeño tubo de drenaje en el quiste o que le hayan hecho james cirugía gladys para permitir que el quiste drene. El tubo suele dejarse colocado por lo menos 4 semanas. Mondragon médico puede hacerle james prueba de laboratorio para identificar las bacterias que causaron la infección. Puede amilcar antibióticos para eliminar las bacterias. Es posible que el quiste siga drenando azalia algunas semanas. Cuando ceda la infección, la glándula debería recobrar mondragon estado normal.  La atención de seguimiento es james parte clave de mondragon tratamiento y seguridad. Asegúrese de hacer y acudir a todas las citas, y llame a mondragon médico si está teniendo problemas. También es james buena idea saber los resultados de los exámenes y mantener james lista de los medicamentos que thom. ¿Cómo puede cuidarse en el hogar? · Si mondragon médico le recetó antibióticos, tómelos según las indicaciones. No deje de tomarlos por el hecho de sentirse mejor. Debe amilcar todos los antibióticos hasta terminarlos. · Siéntese en unas pocas pulgadas de agua tibia (baño de asiento) 3 veces al día y después de evacuar el intestino. El agua tibia ayuda a sanar la roberto y [employer]. · [place] un analgésico (medicamento para el dolor) de venta greer james acetaminofén (Tylenol), ibuprofeno (Advil, Motrin) o naproxeno (Aleve). Alisson y siga todas las instrucciones de la [place]. · No tome dos o más analgésicos al mismo tiempo a menos que modnragon médico se lo haya indicado. Muchos analgésicos contienen acetaminofén, es decir, Tylenol. El exceso de acetaminofén (Tylenol) puede ser dañino. · Use protectores diarios o toallitas sanitarias si tiene secreción que drena del quiste. · Si está activa sexualmente, evite las relaciones sexuales hasta que:  ¨ Haya terminado los antibióticos.   Clementeen Poplin haya sanado. · Si tuvo un tubo de drenaje colocado en el quiste para ayudarlo a drenar, siga las instrucciones de mondragon médico sobre actividades hasta que le saquen el tubo. ¿Cuándo debe pedir ayuda? Llame a mondragon médico ahora mismo o busque atención médica inmediata si:  ? · El dolor [facility] [place]. ? · Tiene fiebre nueva o más alize. ? · La hinchazón aumenta. ? · La roberto enrojecida que rodea al [employer]. ?Preste especial atención a los cambios en mondragon jordy y asegúrese de comunicarse con mondragon médico si:  ? · Se  el tubo de drenaje. ? · Jeaneth síntomas no mejoran en 2 días. ¿Dónde puede encontrar más información en inglés? [employer] a http://danielle-shelley.info/. Escriba H276 en la búsqueda para aprender más acerca de \"Quiste de la glándula de Bartolino: Instrucciones de cuidado - [ Bartholin Gland Cyst: Care Instructions ]. \"  Revisado: 13 2016  Versión del contenido: 11.4  © 5222-8078 Healthwise, Incorporated. Las instrucciones de cuidado fueron adaptadas bajo licencia por [employer] (which disclaims liability or warranty for this information). Si usted tiene [place] afección médica o sobre estas instrucciones, siempre pregunte a mondragon profesional de jordy. Healthwise, Incorporated niega toda garantía o responsabilidad por mondragon uso de esta información.

## 2018-06-26 NOTE — ED PROVIDER NOTES
HPI Comments: Pt is [place] speaking, per , abscess in left labia for months, worsening. Pain unbearable today. No fever, chills, urinary sx or vaginal discharge. Started period today. Patient is a 37 y.o. female presenting with abscess. The history is provided by the patient. The history is limited by a language barrier. A  was used (Quiz[employer]). Abscess    This is a recurrent problem. Episode onset: month. The problem has been rapidly worsening. The problem is associated with nothing. There has been no fever. The rash is present on the genitalia. The pain is at a severity of 10/10. The pain is severe. The pain has been constant since onset. Associated symptoms include pain. Pertinent negatives include no blisters, no itching, no weeping and no hives. She has tried nothing for the symptoms. The treatment provided no relief. History reviewed. No pertinent past medical history. Past Surgical History:   Procedure Laterality Date    HX  SECTION      HX CHOLECYSTECTOMY           History reviewed. No pertinent family history. Social History     Social History    Marital status: SINGLE     Spouse name: N/A    Number of children: N/A    Years of education: N/A     Occupational History    Not on file. Social History Main Topics    Smoking status: Never Smoker    Smokeless tobacco: Never Used    Alcohol use No    Drug use: No    Sexual activity: Not on file     Other Topics Concern    Not on file     Social History Narrative    ** Merged History Encounter **              ALLERGIES: Review of patient's allergies indicates no known allergies. Review of Systems   Constitutional: Negative for chills and fever. Gastrointestinal: Negative. Genitourinary: Negative for difficulty urinating, dyspareunia, dysuria, enuresis, flank pain, frequency, vaginal discharge and vaginal pain. Abscess     Skin: Negative for itching.    All other systems reviewed and are negative. Vitals:    06/26/18 1456   BP: 127/79   Pulse: 88   Resp: 16   Temp: 97.9 °F (36.6 °C)   SpO2: 100%   Weight: 80.7 kg (178 lb)            Physical Exam   Constitutional: She is oriented to person, place, and time. She appears well-developed and well-nourished. She appears distressed. NAD, well hydrated, non toxic     HENT:   Head: Normocephalic and atraumatic. Nose: Nose normal.   Mouth/Throat: Oropharynx is clear and moist. No oropharyngeal exudate. Eyes: Conjunctivae and EOM are normal. Pupils are equal, round, and reactive to light. Neck: Normal range of motion. Neck supple. Cardiovascular: Normal rate, regular rhythm and normal heart sounds. No murmur heard. Pulmonary/Chest: Effort normal and breath sounds normal. No respiratory distress. She has no wheezes. She has no rales. Abdominal: Soft. She exhibits no distension. There is no tenderness. There is no guarding. Genitourinary:       No vaginal discharge found. Genitourinary Comments: 5cm by 3cm fluctuant lesion noted to left labia c/w abscess. TTP. No spontaneous drainage or bleeding. No rectal involvement. Musculoskeletal: Normal range of motion. Lymphadenopathy:     She has no cervical adenopathy. Neurological: She is alert and oriented to person, place, and time. No cranial nerve deficit. Coordination normal.   Skin: Skin is warm. No rash noted. She is not diaphoretic. Psychiatric: She has a normal mood and affect. Her behavior is normal.   Nursing note and vitals reviewed.        MDM  Number of Diagnoses or Management Options  Abscess of Bartholin's gland:   Labial abscess:         ED Course       I&D Abcess Simple  Date/Time: 6/26/2018 4:27 PM  Performed by: Miguel Ramey  Authorized by: Miguel Ramey     Consent:     Consent obtained:  Verbal    Consent given by:  Patient    Risks discussed:  Incomplete drainage, pain, infection and bleeding    Alternatives discussed:  No treatment and referral  Location:     Type:  Bartholin cyst    Size:  6  Pre-procedure details:     Skin preparation:  Betadine  Anesthesia (see MAR for exact dosages): Anesthesia method:  Local infiltration    Local anesthetic:  Lidocaine 1% w/o epi  Procedure type:     Complexity:  Simple  Procedure details:     Needle aspiration: no      Incision types:  Stab incision    Incision depth:  Subcutaneous    Scalpel blade:  11    Wound management:  Probed and deloculated    Drainage:  Bloody and purulent    Drainage amount:  Copious    Wound treatment:  Wound left open  Post-procedure details:     Patient tolerance of procedure: Tolerated well, no immediate complications          Medications ordered:   Medications   oxyCODONE-acetaminophen (PERCOCET) 5-325 mg per tablet 1 Tab (not administered)   lidocaine (PF) (XYLOCAINE) 10 mg/mL (1 %) injection 10 mL (10 mL IntraDERMal Given 6/26/18 1556)         Lab findings:  No results found for this or any previous visit (from the past 12 hour(s)). X-Ray, CT or other radiology findings or impressions:  No results found. Progress notes, Consult notes or additional Procedure notes:   Successful I&D, pt given strict instructions and f/u information will return for wound check and will take abx as directed. Dispo:  Patient was d/c in stable condition. Return to the ER if you are unable to obtain referral as directed.        Ana Reesetolu LeijaKyle's  results have been reviewed with her via . She fully understands. They have been counseled regarding the diagnosis, treatment, and plan. They verbally convey understanding and agreement of the signs, symptoms, diagnosis, treatment and prognosis and additionally agrees to follow up as discussed. They also agrees with the care-plan and conveys that all of her questions have been answered.   I have also provided discharge instructions for her that include: educational information regarding their diagnosis and treatment, and list of reasons why they would want to return to the ED prior to their follow-up appointment, should the condition change. Joseph Leggett PA-C    Diagnosis:   1. Abscess of Bartholin's gland    2. Labial abscess        Follow-up Information     Follow up With Details Comments [address] In 3 days For wound re-check [address] [facility] [address]  [phone]    [facility] EMERGENCY DEPT In 3 days For wound re-check [address]           Patient's Medications   Start Taking    CEFIXIME (SUPRAX) 400 MG CAPSULE    Take 1 Cap by mouth daily for 7 days. CLINDAMYCIN (CLEOCIN) 300 MG CAPSULE    Take 1 Cap by mouth four (4) times daily for 7 days. HYDROCODONE-ACETAMINOPHEN (NORCO) 5-325 MG PER TABLET    Take 1 Tab by mouth every four (4) hours as needed for Pain. Max Daily Amount: 6 Tabs. Continue Taking    No medications on file   These Medications have changed    No medications on file   Stop Taking    CIPROFLOXACIN HCL (CIPRO) 500 MG TABLET    Take 1 Tab by mouth two (2) times a day. CYCLOBENZAPRINE (FLEXERIL) 10 MG TABLET    Take 1 Tab by mouth three (3) times daily as needed for Muscle Spasm(s). HYDROCODONE-ACETAMINOPHEN (NORCO) 5-325 MG PER TABLET    Take 1 Tab by mouth every four (4) hours as needed for Pain. Max Daily Amount: 6 Tabs.

## 2018-07-02 ENCOUNTER — HOSPITAL ENCOUNTER (EMERGENCY)
Age: 43
Discharge: HOME | End: 2018-07-02
Attending: EMERGENCY MEDICINE | Admitting: EMERGENCY MEDICINE
Payer: SELF-PAY

## 2018-07-02 VITALS
SYSTOLIC BLOOD PRESSURE: 117 MMHG | TEMPERATURE: 98.9 F | RESPIRATION RATE: 16 BRPM | HEART RATE: 87 BPM | OXYGEN SATURATION: 100 % | DIASTOLIC BLOOD PRESSURE: 79 MMHG

## 2018-07-02 DIAGNOSIS — N75.0 BARTHOLIN'S CYST: Primary | ICD-10-CM

## 2018-07-02 LAB
ALBUMIN SERPL-MCNC: 3.7 G/DL (ref 3.4–5)
ALBUMIN/GLOB SERPL: 1.1 {RATIO} (ref 0.8–1.7)
ALP SERPL-CCNC: 120 U/L (ref 45–117)
ALT SERPL-CCNC: 27 U/L (ref 13–56)
ANION GAP SERPL CALC-SCNC: 7 MMOL/L (ref 3–18)
APPEARANCE UR: CLEAR
AST SERPL-CCNC: 19 U/L (ref 15–37)
BACTERIA URNS QL MICRO: ABNORMAL /HPF
BASOPHILS # BLD: 0 K/UL (ref 0–0.06)
BASOPHILS NFR BLD: 0 % (ref 0–2)
BILIRUB SERPL-MCNC: 0.5 MG/DL (ref 0.2–1)
BILIRUB UR QL: NEGATIVE
BUN SERPL-MCNC: 8 MG/DL (ref 7–18)
BUN/CREAT SERPL: 11 (ref 12–20)
CALCIUM SERPL-MCNC: 8.6 MG/DL (ref 8.5–10.1)
CHLORIDE SERPL-SCNC: 104 MMOL/L (ref 100–108)
CO2 SERPL-SCNC: 30 MMOL/L (ref 21–32)
COLOR UR: YELLOW
CREAT SERPL-MCNC: 0.73 MG/DL (ref 0.6–1.3)
DIFFERENTIAL METHOD BLD: ABNORMAL
EOSINOPHIL # BLD: 0.2 K/UL (ref 0–0.4)
EOSINOPHIL NFR BLD: 3 % (ref 0–5)
EPITH CASTS URNS QL MICRO: ABNORMAL /LPF (ref 0–5)
ERYTHROCYTE [DISTWIDTH] IN BLOOD BY AUTOMATED COUNT: 14.3 % (ref 11.6–14.5)
GLOBULIN SER CALC-MCNC: 3.5 G/DL (ref 2–4)
GLUCOSE SERPL-MCNC: 98 MG/DL (ref 74–99)
GLUCOSE UR STRIP.AUTO-MCNC: NEGATIVE MG/DL
HCG UR QL: NEGATIVE
HCT VFR BLD AUTO: 34.5 % (ref 35–45)
HGB BLD-MCNC: 11.2 G/DL (ref 12–16)
HGB UR QL STRIP: NEGATIVE
KETONES UR QL STRIP.AUTO: NEGATIVE MG/DL
LEUKOCYTE ESTERASE UR QL STRIP.AUTO: ABNORMAL
LYMPHOCYTES # BLD: 1.2 K/UL (ref 0.9–3.6)
LYMPHOCYTES NFR BLD: 19 % (ref 21–52)
MCH RBC QN AUTO: 27 PG (ref 24–34)
MCHC RBC AUTO-ENTMCNC: 32.5 G/DL (ref 31–37)
MCV RBC AUTO: 83.1 FL (ref 74–97)
MONOCYTES # BLD: 0.3 K/UL (ref 0.05–1.2)
MONOCYTES NFR BLD: 5 % (ref 3–10)
MUCOUS THREADS URNS QL MICRO: ABNORMAL /LPF
NEUTS SEG # BLD: 4.3 K/UL (ref 1.8–8)
NEUTS SEG NFR BLD: 73 % (ref 40–73)
NITRITE UR QL STRIP.AUTO: NEGATIVE
PH UR STRIP: 5.5 [PH] (ref 5–8)
PLATELET # BLD AUTO: 261 K/UL (ref 135–420)
PMV BLD AUTO: 9.8 FL (ref 9.2–11.8)
POTASSIUM SERPL-SCNC: 4 MMOL/L (ref 3.5–5.5)
PROT SERPL-MCNC: 7.2 G/DL (ref 6.4–8.2)
PROT UR STRIP-MCNC: NEGATIVE MG/DL
RBC # BLD AUTO: 4.15 M/UL (ref 4.2–5.3)
RBC #/AREA URNS HPF: NEGATIVE /HPF (ref 0–5)
SODIUM SERPL-SCNC: 141 MMOL/L (ref 136–145)
SP GR UR REFRACTOMETRY: 1.02 (ref 1–1.03)
UROBILINOGEN UR QL STRIP.AUTO: 0.2 EU/DL (ref 0.2–1)
WBC # BLD AUTO: 5.9 K/UL (ref 4.6–13.2)
WBC URNS QL MICRO: ABNORMAL /HPF (ref 0–4)

## 2018-07-02 PROCEDURE — 99283 EMERGENCY DEPT VISIT LOW MDM: CPT

## 2018-07-02 PROCEDURE — 74011250637 HC RX REV CODE- 250/637: Performed by: PHYSICIAN ASSISTANT

## 2018-07-02 PROCEDURE — 75810000289 HC I&D ABSCESS SIMP/COMP/MULT

## 2018-07-02 PROCEDURE — 80053 COMPREHEN METABOLIC PANEL: CPT | Performed by: PHYSICIAN ASSISTANT

## 2018-07-02 PROCEDURE — 81001 URINALYSIS AUTO W/SCOPE: CPT | Performed by: PHYSICIAN ASSISTANT

## 2018-07-02 PROCEDURE — 85025 COMPLETE CBC W/AUTO DIFF WBC: CPT | Performed by: PHYSICIAN ASSISTANT

## 2018-07-02 PROCEDURE — 81025 URINE PREGNANCY TEST: CPT | Performed by: PHYSICIAN ASSISTANT

## 2018-07-02 RX ORDER — HYDROCODONE BITARTRATE AND ACETAMINOPHEN 7.5; 325 MG/1; MG/1
1 TABLET ORAL
Qty: 20 TAB | Refills: 0 | Status: SHIPPED | OUTPATIENT
Start: 2018-07-02 | End: 2019-09-03

## 2018-07-02 RX ORDER — SULFAMETHOXAZOLE AND TRIMETHOPRIM 800; 160 MG/1; MG/1
1 TABLET ORAL 2 TIMES DAILY
Qty: 14 TAB | Refills: 0 | Status: SHIPPED | OUTPATIENT
Start: 2018-07-02 | End: 2018-07-09

## 2018-07-02 RX ORDER — OXYCODONE AND ACETAMINOPHEN 5; 325 MG/1; MG/1
1 TABLET ORAL
Status: COMPLETED | OUTPATIENT
Start: 2018-07-02 | End: 2018-07-02

## 2018-07-02 RX ADMIN — OXYCODONE HYDROCHLORIDE AND ACETAMINOPHEN 1 TABLET: 5; 325 TABLET ORAL at 10:40

## 2018-07-02 NOTE — ED TRIAGE NOTES
Patient has itchy feeling and pain in her vagina. Patient asking for a note for her employer. Patient states that she had a cyst/abscess drained on her vagina. Patient states that she has numbness and worsening pain.

## 2018-07-02 NOTE — LETTER
NOTIFICATION RETURN TO WORK / SCHOOL 
 
7/2/2018 12:22 PM 
 
Ms. Poonam Meng [address] [address] To Whom It May Concern: 
 
Poonam Meng is currently under the care of [facility] EMERGENCY DEPT. She will return to work/school on: 7/5/18 or sooner if pt able to tolerate If there are questions or concerns please have the patient contact our office.  
 
 
 
Sincerely, 
 
 
JAIME Baron

## 2018-07-02 NOTE — ED PROVIDER NOTES
EMERGENCY DEPARTMENT HISTORY AND PHYSICAL EXAM    Date: 2018  Patient Name: Luis Carlos Lenz    History of Presenting Illness     Chief Complaint   Patient presents with    Vaginal Pain         History Provided By: Patient through blue translation phones    Chief Complaint: vaginal pain  Duration: 3 Days  Timing:  Worsening  Location: left sided vaginal pain  Quality: Sharp  Severity: 10 out of 10  Modifying Factors: none  Associated Symptoms: fever, abdominal pain, drainage      Additional History (Context): Luis Carlos Lenz is a 37 y.o. female with No significant past medical history who presents with continued vaginal pain following drainage of bartholin's cyst on . PT states she is taking antibiotics as prescribed but the pain medications is not touching her pain. She states hse has had subjective fever and pain is moving into stomach and leg. She denies NVD, CP, SOB  PCP: None    Current Outpatient Prescriptions   Medication Sig Dispense Refill    HYDROcodone-acetaminophen (NORCO) 7.5-325 mg per tablet Take 1 Tab by mouth every six (6) hours as needed for Pain. Max Daily Amount: 4 Tabs. 20 Tab 0    trimethoprim-sulfamethoxazole (BACTRIM DS) 160-800 mg per tablet Take 1 Tab by mouth two (2) times a day for 7 days. 14 Tab 0    cefixime (SUPRAX) 400 mg capsule Take 1 Cap by mouth daily for 7 days. 7 Cap 0    clindamycin (CLEOCIN) 300 mg capsule Take 1 Cap by mouth four (4) times daily for 7 days. 40 Cap 0       Past History     Past Medical History:  No past medical history on file. Past Surgical History:  Past Surgical History:   Procedure Laterality Date    HX  SECTION      HX CHOLECYSTECTOMY         Family History:  No family history on file.     Social History:  Social History   Substance Use Topics    Smoking status: Never Smoker    Smokeless tobacco: Never Used    Alcohol use No       Allergies:  No Known Allergies      Review of Systems   Review of Systems Constitutional: Negative for fatigue and fever. HENT: Negative for congestion. Eyes: Negative for pain. Respiratory: Negative for cough and shortness of breath. Cardiovascular: Negative for chest pain. Gastrointestinal: Negative for abdominal pain, diarrhea, nausea and vomiting. Genitourinary: Positive for vaginal pain. Musculoskeletal: Negative for back pain. Skin: Negative for wound. Neurological: Negative for dizziness and headaches. All other systems reviewed and are negative. All Other Systems Negative  Physical Exam     Vitals:    07/02/18 1006   BP: 117/79   Pulse: 87   Resp: 16   Temp: 98.9 °F (37.2 °C)   SpO2: 100%     Physical Exam   Constitutional: She is oriented to person, place, and time. She appears well-developed and well-nourished. No distress. HENT:   Head: Normocephalic and atraumatic. Eyes: Conjunctivae are normal. Pupils are equal, round, and reactive to light. Neck: Normal range of motion. Cardiovascular: Normal rate, regular rhythm and normal heart sounds. Pulmonary/Chest: Breath sounds normal. No respiratory distress. She has no wheezes. Abdominal: Soft. Bowel sounds are normal. She exhibits no distension. There is no tenderness. Genitourinary:       There is tenderness on the left labia. No erythema or bleeding in the vagina. No foreign body in the vagina. Genitourinary Comments: 3 cm x6 cm abscess to left labia   Musculoskeletal: Normal range of motion. Neurological: She is alert and oriented to person, place, and time. Skin: Skin is warm and dry.    Psychiatric: Her behavior is normal.            Diagnostic Study Results     Labs -     Recent Results (from the past 12 hour(s))   HCG URINE, QL    Collection Time: 07/02/18 10:21 AM   Result Value Ref Range    HCG urine, QL NEGATIVE  NEG     CBC WITH AUTOMATED DIFF    Collection Time: 07/02/18 10:30 AM   Result Value Ref Range    WBC 5.9 4.6 - 13.2 K/uL    RBC 4.15 (L) 4.20 - 5.30 M/uL    HGB 11.2 (L) 12.0 - 16.0 g/dL    HCT 34.5 (L) 35.0 - 45.0 %    MCV 83.1 74.0 - 97.0 FL    MCH 27.0 24.0 - 34.0 PG    MCHC 32.5 31.0 - 37.0 g/dL    RDW 14.3 11.6 - 14.5 %    PLATELET 133 470 - 128 K/uL    MPV 9.8 9.2 - 11.8 FL    NEUTROPHILS 73 40 - 73 %    LYMPHOCYTES 19 (L) 21 - 52 %    MONOCYTES 5 3 - 10 %    EOSINOPHILS 3 0 - 5 %    BASOPHILS 0 0 - 2 %    ABS. NEUTROPHILS 4.3 1.8 - 8.0 K/UL    ABS. LYMPHOCYTES 1.2 0.9 - 3.6 K/UL    ABS. MONOCYTES 0.3 0.05 - 1.2 K/UL    ABS. EOSINOPHILS 0.2 0.0 - 0.4 K/UL    ABS. BASOPHILS 0.0 0.0 - 0.06 K/UL    DF AUTOMATED     METABOLIC PANEL, COMPREHENSIVE    Collection Time: 07/02/18 10:30 AM   Result Value Ref Range    Sodium 141 136 - 145 mmol/L    Potassium 4.0 3.5 - 5.5 mmol/L    Chloride 104 100 - 108 mmol/L    CO2 30 21 - 32 mmol/L    Anion gap 7 3.0 - 18 mmol/L    Glucose 98 74 - 99 mg/dL    BUN 8 7.0 - 18 MG/DL    Creatinine 0.73 0.6 - 1.3 MG/DL    BUN/Creatinine ratio 11 (L) 12 - 20      GFR est AA >60 >60 ml/min/1.73m2    GFR est non-AA >60 >60 ml/min/1.73m2    Calcium 8.6 8.5 - 10.1 MG/DL    Bilirubin, total 0.5 0.2 - 1.0 MG/DL    ALT (SGPT) 27 13 - 56 U/L    AST (SGOT) 19 15 - 37 U/L    Alk.  phosphatase 120 (H) 45 - 117 U/L    Protein, total 7.2 6.4 - 8.2 g/dL    Albumin 3.7 3.4 - 5.0 g/dL    Globulin 3.5 2.0 - 4.0 g/dL    A-G Ratio 1.1 0.8 - 1.7     URINALYSIS W/ RFLX MICROSCOPIC    Collection Time: 07/02/18 10:30 AM   Result Value Ref Range    Color YELLOW      Appearance CLEAR      Specific gravity 1.023 1.005 - 1.030      pH (UA) 5.5 5.0 - 8.0      Protein NEGATIVE  NEG mg/dL    Glucose NEGATIVE  NEG mg/dL    Ketone NEGATIVE  NEG mg/dL    Bilirubin NEGATIVE  NEG      Blood NEGATIVE  NEG      Urobilinogen 0.2 0.2 - 1.0 EU/dL    Nitrites NEGATIVE  NEG      Leukocyte Esterase TRACE (A) NEG     URINE MICROSCOPIC ONLY    Collection Time: 07/02/18 10:30 AM   Result Value Ref Range    WBC 0 to 3 0 - 4 /hpf    RBC NEGATIVE  0 - 5 /hpf    Epithelial cells 3+ 0 - 5 /lpf Bacteria 1+ (A) NEG /hpf    Mucus 1+ (A) NEG /lpf       Radiologic Studies -   No orders to display     CT Results  (Last 48 hours)    None        CXR Results  (Last 48 hours)    None            Medical Decision Making   I am the first provider for this patient. I reviewed the vital signs, available nursing notes, past medical history, past surgical history, family history and social history. Vital Signs-Reviewed the patient's vital signs. Records Reviewed: Old Medical Records    Procedures:  I&D Abcess Simple  Date/Time: 7/2/2018 12:26 PM  Performed by: Karma Xavier  Authorized by: Man CAMARGO     Consent:     Consent obtained:  Verbal    Consent given by:  Patient    Risks discussed:  Bleeding and incomplete drainage  Location:     Type:  Bartholin cyst    Size:  6 cm x 3 cm  Pre-procedure details:     Skin preparation:  Antiseptic wash  Anesthesia (see MAR for exact dosages): Anesthesia method:  Local infiltration    Local anesthetic:  Lidocaine 1% w/o epi  Procedure type:     Complexity:  Simple  Procedure details:     Needle aspiration: no      Incision types:  Single straight    Incision depth:  Dermal    Scalpel blade:  11    Wound management:  Probed and deloculated, irrigated with saline and extensive cleaning    Drainage:  Purulent    Drainage amount:  Copious    Wound treatment:  Wound left open    Packing materials:  None  Post-procedure details:     Patient tolerance of procedure: Tolerated with difficulty        Provider Notes (Medical Decision Making): Impression: Bartholin's Cyst    I&D and discharge home with antibiotics and pain control    MED RECONCILIATION:  No current facility-administered medications for this encounter. Current Outpatient Prescriptions   Medication Sig    HYDROcodone-acetaminophen (NORCO) 7.5-325 mg per tablet Take 1 Tab by mouth every six (6) hours as needed for Pain. Max Daily Amount: 4 Tabs.     trimethoprim-sulfamethoxazole (BACTRIM DS) 160-800 mg per tablet Take 1 Tab by mouth two (2) times a day for 7 days.  cefixime (SUPRAX) 400 mg capsule Take 1 Cap by mouth daily for 7 days.  clindamycin (CLEOCIN) 300 mg capsule Take 1 Cap by mouth four (4) times daily for 7 days. Disposition:  discharge    DISCHARGE NOTE:     Pt has been reexamined. Patient has no new complaints, changes, or physical findings. Care plan outlined and precautions discussed. Results of exam were reviewed with the patient. All medications were reviewed with the patient; will d/c home with antibiotics and pain control. All of pt's questions and concerns were addressed. Patient was instructed and agrees to follow up with OBGYN, as well as to return to the ED upon further deterioration. Patient is ready to go home. Follow-up Information     Follow up With Details Comments Contact Info    Yvonne Pang MD Call As needed, follow up [address] [address] [facility] EMERGENCY DEPT  If symptoms worsen [address]  [phone]          Current Discharge Medication List      START taking these medications    Details   HYDROcodone-acetaminophen (Dora Lori) 7.5-325 mg per tablet Take 1 Tab by mouth every six (6) hours as needed for Pain. Max Daily Amount: 4 Tabs. Qty: 20 Tab, Refills: 0    Associated Diagnoses: Bartholin's cyst      trimethoprim-sulfamethoxazole (BACTRIM DS) 160-800 mg per tablet Take 1 Tab by mouth two (2) times a day for 7 days. Qty: 14 Tab, Refills: 0         STOP taking these medications       HYDROcodone-acetaminophen (NORCO) 5-325 mg per tablet Comments:   Reason for Stopping:                 Diagnosis     Clinical Impression:   1.  Bartholin's cyst

## 2019-09-03 ENCOUNTER — APPOINTMENT (OUTPATIENT)
Dept: CT IMAGING | Age: 44
End: 2019-09-03
Attending: EMERGENCY MEDICINE
Payer: SELF-PAY

## 2019-09-03 ENCOUNTER — HOSPITAL ENCOUNTER (EMERGENCY)
Age: 44
Discharge: HOME | End: 2019-09-03
Attending: EMERGENCY MEDICINE | Admitting: EMERGENCY MEDICINE
Payer: SELF-PAY

## 2019-09-03 VITALS
TEMPERATURE: 98.2 F | DIASTOLIC BLOOD PRESSURE: 86 MMHG | RESPIRATION RATE: 16 BRPM | HEIGHT: 66 IN | BODY MASS INDEX: 27.32 KG/M2 | WEIGHT: 170 LBS | OXYGEN SATURATION: 100 % | SYSTOLIC BLOOD PRESSURE: 128 MMHG | HEART RATE: 78 BPM

## 2019-09-03 DIAGNOSIS — M54.50 ACUTE MIDLINE LOW BACK PAIN WITHOUT SCIATICA: Primary | ICD-10-CM

## 2019-09-03 LAB
ALBUMIN SERPL-MCNC: 3.7 G/DL (ref 3.4–5)
ALBUMIN/GLOB SERPL: 1.3 {RATIO} (ref 0.8–1.7)
ALP SERPL-CCNC: 82 U/L (ref 45–117)
ALT SERPL-CCNC: 20 U/L (ref 13–56)
ANION GAP SERPL CALC-SCNC: 6 MMOL/L (ref 3–18)
AST SERPL-CCNC: 17 U/L (ref 10–38)
BASOPHILS # BLD: 0 K/UL (ref 0–0.1)
BASOPHILS NFR BLD: 0 % (ref 0–2)
BILIRUB SERPL-MCNC: 0.5 MG/DL (ref 0.2–1)
BUN SERPL-MCNC: 10 MG/DL (ref 7–18)
BUN/CREAT SERPL: 15 (ref 12–20)
CALCIUM SERPL-MCNC: 8.3 MG/DL (ref 8.5–10.1)
CHLORIDE SERPL-SCNC: 110 MMOL/L (ref 100–111)
CO2 SERPL-SCNC: 27 MMOL/L (ref 21–32)
CREAT SERPL-MCNC: 0.67 MG/DL (ref 0.6–1.3)
DIFFERENTIAL METHOD BLD: ABNORMAL
EOSINOPHIL # BLD: 0.1 K/UL (ref 0–0.4)
EOSINOPHIL NFR BLD: 1 % (ref 0–5)
ERYTHROCYTE [DISTWIDTH] IN BLOOD BY AUTOMATED COUNT: 15.9 % (ref 11.6–14.5)
GLOBULIN SER CALC-MCNC: 2.9 G/DL (ref 2–4)
GLUCOSE SERPL-MCNC: 97 MG/DL (ref 74–99)
HCG UR QL: NEGATIVE
HCT VFR BLD AUTO: 35.2 % (ref 35–45)
HGB BLD-MCNC: 11.1 G/DL (ref 12–16)
LIPASE SERPL-CCNC: 110 U/L (ref 73–393)
LYMPHOCYTES # BLD: 1.1 K/UL (ref 0.9–3.6)
LYMPHOCYTES NFR BLD: 24 % (ref 21–52)
MCH RBC QN AUTO: 26.1 PG (ref 24–34)
MCHC RBC AUTO-ENTMCNC: 31.5 G/DL (ref 31–37)
MCV RBC AUTO: 82.6 FL (ref 74–97)
MONOCYTES # BLD: 0.3 K/UL (ref 0.05–1.2)
MONOCYTES NFR BLD: 7 % (ref 3–10)
NEUTS SEG # BLD: 3 K/UL (ref 1.8–8)
NEUTS SEG NFR BLD: 68 % (ref 40–73)
PLATELET # BLD AUTO: 224 K/UL (ref 135–420)
PMV BLD AUTO: 9.9 FL (ref 9.2–11.8)
POTASSIUM SERPL-SCNC: 3.8 MMOL/L (ref 3.5–5.5)
PROT SERPL-MCNC: 6.6 G/DL (ref 6.4–8.2)
RBC # BLD AUTO: 4.26 M/UL (ref 4.2–5.3)
SODIUM SERPL-SCNC: 143 MMOL/L (ref 136–145)
WBC # BLD AUTO: 4.5 K/UL (ref 4.6–13.2)

## 2019-09-03 PROCEDURE — 80053 COMPREHEN METABOLIC PANEL: CPT

## 2019-09-03 PROCEDURE — 96374 THER/PROPH/DIAG INJ IV PUSH: CPT

## 2019-09-03 PROCEDURE — 81025 URINE PREGNANCY TEST: CPT

## 2019-09-03 PROCEDURE — 74011636320 HC RX REV CODE- 636/320: Performed by: EMERGENCY MEDICINE

## 2019-09-03 PROCEDURE — 99283 EMERGENCY DEPT VISIT LOW MDM: CPT

## 2019-09-03 PROCEDURE — 74177 CT ABD & PELVIS W/CONTRAST: CPT

## 2019-09-03 PROCEDURE — 83690 ASSAY OF LIPASE: CPT

## 2019-09-03 PROCEDURE — 74011250636 HC RX REV CODE- 250/636: Performed by: EMERGENCY MEDICINE

## 2019-09-03 PROCEDURE — 85025 COMPLETE CBC W/AUTO DIFF WBC: CPT

## 2019-09-03 RX ORDER — KETOROLAC TROMETHAMINE 30 MG/ML
15 INJECTION, SOLUTION INTRAMUSCULAR; INTRAVENOUS
Status: COMPLETED | OUTPATIENT
Start: 2019-09-03 | End: 2019-09-03

## 2019-09-03 RX ADMIN — SODIUM CHLORIDE 1000 ML: 900 INJECTION, SOLUTION INTRAVENOUS at 17:31

## 2019-09-03 RX ADMIN — IOPAMIDOL 96 ML: 612 INJECTION, SOLUTION INTRAVENOUS at 18:17

## 2019-09-03 RX ADMIN — KETOROLAC TROMETHAMINE 15 MG: 30 INJECTION, SOLUTION INTRAMUSCULAR at 19:40

## 2019-09-03 NOTE — ED TRIAGE NOTES
1 month of lower pelvic pain and abdominal,pain. Normal void and BM.  Causes difficulty moving around

## 2019-09-03 NOTE — ED PROVIDER NOTES
EMERGENCY DEPARTMENT HISTORY AND PHYSICAL EXAM    4:46 PM      Date: 9/3/2019  Patient Name: Kiara Barraza    History of Presenting Illness     Chief Complaint   Patient presents with    Abdominal Pain    Pelvic Pain         History Provided By: patient    Additional History (Context): Kiara Barraza is a 40 y.o. female presents with back pain and abdominal pain. The pain extends from her coccyx of the lumbar spine wraps around bilateral flanks to the anterior and she is tender on the anterior as well. She works with her  who is a , and she cleans up debris at the site. No vomiting diarrhea fevers urinary symptoms. She is status post cholecystectomy. Jarrod Mcrae PCP: None    Chief Complaint:   Duration:    Timing:    Location:   Quality:   Severity:   Modifying Factors:   Associated Symptoms:       Current Facility-Administered Medications   Medication Dose Route Frequency Provider Last Rate Last Dose    ketorolac (TORADOL) injection 15 mg  15 mg IntraVENous NOW Radha Flanagan MD           Past History     Past Medical History:  History reviewed. No pertinent past medical history. Past Surgical History:  Past Surgical History:   Procedure Laterality Date    HX  SECTION      HX CHOLECYSTECTOMY         Family History:  History reviewed. No pertinent family history. Social History:  Social History     Tobacco Use    Smoking status: Never Smoker    Smokeless tobacco: Never Used   Substance Use Topics    Alcohol use: No    Drug use: No       Allergies:  No Known Allergies      Review of Systems     Review of Systems   Constitutional: Negative for diaphoresis and fever. HENT: Negative for congestion and sore throat. Eyes: Negative for pain and itching. Respiratory: Negative for cough and shortness of breath. Cardiovascular: Negative for chest pain and palpitations. Gastrointestinal: Positive for abdominal pain. Negative for diarrhea.    Endocrine: Negative for polydipsia and polyuria. Genitourinary: Negative for dysuria and hematuria. Musculoskeletal: Positive for back pain. Negative for arthralgias and myalgias. Skin: Negative for rash and wound. Neurological: Negative for seizures and syncope. Hematological: Does not bruise/bleed easily. Psychiatric/Behavioral: Negative for agitation and hallucinations. Physical Exam       Patient Vitals for the past 12 hrs:   Temp Pulse Resp BP SpO2   09/03/19 1617 98.2 °F (36.8 °C) 80 15 116/75 100 %       Physical Exam   Constitutional: She appears well-developed and well-nourished. She appears distressed. She moves slowly in the bed but demonstrates intact motor and sensory function of the legs   HENT:   Head: Normocephalic and atraumatic. Eyes: Conjunctivae are normal. No scleral icterus. Neck: Normal range of motion. Neck supple. No JVD present. Cardiovascular: Normal rate, regular rhythm and normal heart sounds. 4 intact extremity pulses   Pulmonary/Chest: Effort normal and breath sounds normal.   Abdominal: Soft. She exhibits no mass. There is tenderness (Upper abdominal tenderness). Musculoskeletal: Normal range of motion. Pain over the coccyx as well as bilateral CVAs. Lymphadenopathy:     She has no cervical adenopathy. Neurological: She is alert. Skin: Skin is warm and dry. Nursing note and vitals reviewed.         Diagnostic Study Results   Labs -  Recent Results (from the past 12 hour(s))   HCG URINE, QL    Collection Time: 09/03/19  5:10 PM   Result Value Ref Range    HCG urine, QL NEGATIVE  NEG     METABOLIC PANEL, COMPREHENSIVE    Collection Time: 09/03/19  5:15 PM   Result Value Ref Range    Sodium 143 136 - 145 mmol/L    Potassium 3.8 3.5 - 5.5 mmol/L    Chloride 110 100 - 111 mmol/L    CO2 27 21 - 32 mmol/L    Anion gap 6 3.0 - 18 mmol/L    Glucose 97 74 - 99 mg/dL    BUN 10 7.0 - 18 MG/DL    Creatinine 0.67 0.6 - 1.3 MG/DL    BUN/Creatinine ratio 15 12 - 20 GFR est AA >60 >60 ml/min/1.73m2    GFR est non-AA >60 >60 ml/min/1.73m2    Calcium 8.3 (L) 8.5 - 10.1 MG/DL    Bilirubin, total 0.5 0.2 - 1.0 MG/DL    ALT (SGPT) 20 13 - 56 U/L    AST (SGOT) 17 10 - 38 U/L    Alk. phosphatase 82 45 - 117 U/L    Protein, total 6.6 6.4 - 8.2 g/dL    Albumin 3.7 3.4 - 5.0 g/dL    Globulin 2.9 2.0 - 4.0 g/dL    A-G Ratio 1.3 0.8 - 1.7     LIPASE    Collection Time: 09/03/19  5:15 PM   Result Value Ref Range    Lipase 110 73 - 393 U/L   CBC WITH AUTOMATED DIFF    Collection Time: 09/03/19  5:15 PM   Result Value Ref Range    WBC 4.5 (L) 4.6 - 13.2 K/uL    RBC 4.26 4.20 - 5.30 M/uL    HGB 11.1 (L) 12.0 - 16.0 g/dL    HCT 35.2 35.0 - 45.0 %    MCV 82.6 74.0 - 97.0 FL    MCH 26.1 24.0 - 34.0 PG    MCHC 31.5 31.0 - 37.0 g/dL    RDW 15.9 (H) 11.6 - 14.5 %    PLATELET 087 494 - 230 K/uL    MPV 9.9 9.2 - 11.8 FL    NEUTROPHILS 68 40 - 73 %    LYMPHOCYTES 24 21 - 52 %    MONOCYTES 7 3 - 10 %    EOSINOPHILS 1 0 - 5 %    BASOPHILS 0 0 - 2 %    ABS. NEUTROPHILS 3.0 1.8 - 8.0 K/UL    ABS. LYMPHOCYTES 1.1 0.9 - 3.6 K/UL    ABS. MONOCYTES 0.3 0.05 - 1.2 K/UL    ABS. EOSINOPHILS 0.1 0.0 - 0.4 K/UL    ABS. BASOPHILS 0.0 0.0 - 0.1 K/UL    DF AUTOMATED         Radiologic Studies -   CT ABD PELV W CONT    (Results Pending)     No results found. Medications ordered:   Medications   ketorolac (TORADOL) injection 15 mg (has no administration in time range)   sodium chloride 0.9 % bolus infusion 1,000 mL (1,000 mL IntraVENous New Bag 9/3/19 1731)   Please order Hcg to confirm safety of ketorolac (1 Each Other Given 9/3/19 1732)   iopamidol (ISOVUE 300) 61 % contrast injection 100 mL (96 mL IntraVENous Given 9/3/19 1817)         Medical Decision Making   Initial Medical Decision Making and DDx:  Patient is tender throughout the back and upper abdomen. Difficult to rule out alarming pathology. She is had surgeries on her belly placing her at risk for adhesions and obstruction so we will get CT scan.   Also possible is muscular pain related to her work. ED Course: Progress Notes, Reevaluation, and Consults:     7:20 PM Pt reevaluated at this time. Discussed results and findings, as well as, diagnosis and plan for discharge. Follow up with doctors/services listed. Return to the emergency department for alarming symptoms. Pt verbalizes understanding and agreement with plan. All questions addressed. Dr Alexx Cornell read abdominal CT, right Bartholin cyst, cholecystectomy, no other findings noted. Labs are benign, no acute events with the patient. On reassessment she appears nontoxic in minimal distress. She says the pain gets worse while lying down and better with walking so have encouraged her to walk with activity, this consistent with muscular pain probably from working. Over-the-counter medications as well. She is happy with this plan. I am the first provider for this patient. I reviewed the vital signs, available nursing notes, past medical history, past surgical history, family history and social history. Patient Vitals for the past 12 hrs:   Temp Pulse Resp BP SpO2   09/03/19 1617 98.2 °F (36.8 °C) 80 15 116/75 100 %       Vital Signs-Reviewed the patient's vital signs. Pulse Oximetry Analysis, Cardiac Monitor, 12 lead ekg:       Interpreted by the EP. Records Reviewed: Nursing notes reviewed (Time of Review: 4:46 PM)    Procedures:   Critical Care Time:   Aspirin: (was aspirin given for stroke?)    Diagnosis     Clinical Impression:   1.  Acute midline low back pain without sciatica        Disposition: Discharged      Follow-up Information     Follow up With Specialties Details Why [address]  In 2 days  [address]  [phone]           Patient's Medications   Start Taking    No medications on file   Continue Taking    No medications on file   These Medications have changed    No medications on file   Stop Taking HYDROCODONE-ACETAMINOPHEN (NORCO) 7.5-325 MG PER TABLET    Take 1 Tab by mouth every six (6) hours as needed for Pain.  Max Daily Amount: 4 Tabs.     _______________________________    Notes:    Eugenio Amezquita MD using Dragon dictation      _______________________________

## 2019-09-03 NOTE — DISCHARGE INSTRUCTIONS
Patient Education        Dolor de espalda: Instrucciones de cuidado - [ Back Pain: Care Instructions ]  Instrucciones de cuidado    El dolor de espalda tiene muchas causas posibles. Con frecuencia, está relacionado con problemas en los músculos y ligamentos de la espalda. También podría estar relacionado con problemas de los nervios, discos o huesos de la espalda. Moverse, levantar algo, ponerse de pie, sentarse o dormir de [place] incómoda pueden forzar la espalda. Algunas veces no se da cuenta de que tiene james lesión [employer] tarde. La artritis es otra causa común del dolor de espalda. Aunque nina vez duela mucho, el dolor de espalda por lo general mejora por sí mismo en varias semanas. [address] personas se recuperan en 12 semanas o menos. Hacer un buen tratamiento en el hogar y tener cuidado de no forzar la espalda puede ayudar a que se sienta mejor más pronto. La atención de seguimiento es james parte clave de mondragon tratamiento y seguridad. Asegúrese de hacer y acudir a todas las citas, y llame a mondragon médico si está teniendo problemas. También es james buena idea saber los resultados de roxann exámenes y mantener james lista de los medicamentos que thom. ¿Cómo puede cuidarse en el hogar? · Siéntese o acuéstese en las posiciones más cómodas y que reduzcan el dolor. Trate james de estas posiciones al [id number]:  ? Acuéstese boca arriba con las rodillas dobladas y apoyadas sobre [address]. ? Acuéstese en el piso con las piernas sobre el asiento de un sofá o james silla. ? Acuéstese de lado con las rodillas y caderas dobladas y [place] almohada entre las piernas. ? Acuéstese boca abajo siempre que esta posición no empeore el dolor. · No se siente en la cama y evite los sofás blandos y las posiciones torcidas. El reposo en cama puede aliviar el dolor al principio, fabian retrasa la sanación. Evite reposar en la cama después del primer día de dolor de espalda. · Cambie de posición cada 30 minutos.  Si debe sentarse por [employer], párese y descanse de estar sentado. Levántese y camine, o acuéstese en james posición cómoda. · Pruebe usar james almohadilla térmica a temperatura baja o mediana azalia 15 a 20 minutos cada 2 ó 3 horas. Pruebe james ducha tibia en vez de james sesión con la almohadilla térmica. · También puede probar james compresa de hielo azalia 10 a 15 minutos cada 2 a 3 horas. Póngase un paño almanza entre la compresa de hielo y la piel. · [employer] analgésicos (medicamentos para el dolor) exactamente según las indicaciones. ? Si el médico le recetó un analgésico, tómelo según las indicaciones. ? Si no está tomando un analgésico recetado, pregúntele a mondragon médico si puede amilcar yarelis de [employer]. · Otto caminatas cortas varias veces al día. Usted puede comenzar con 5 a 10 minutos, 3 ó 4 veces al día, y aumentar progresivamente hasta lograr caminatas más largas. Camine en superficies jaret y evite colinas y escaleras hasta que la espalda esté mejor. · Regrese al [place] y otras actividades lo más pronto posible. El descanso continuo sin actividad por lo general no es valerio para la espalda. · Para prevenir el dolor de espalda en el futuro, otto ejercicios para estirar y fortalecer la espalda y el abdomen. Aprenda a [employer], técnicas seguras para levantar peso y la mecánica corporal apropiada. ¿Cuándo debe pedir ayuda? Llame a mondragon médico ahora mismo o busque atención médica inmediata si:    · Tiene un entumecimiento nuevo o peor en las piernas.     · Tiene debilidad nueva o peor en las piernas. ([place] puede hacer que le resulte difícil ponerse de pie).   · Pierde el control de la vejiga o los intestinos.    Preste especial atención a los cambios en mondragon jordy y asegúrese de comunicarse con mondragon médico si:    · Tiene fiebre, pierde peso o no se siente yasmine.     · No mejora james se esperaba. ¿Dónde puede encontrar más información en inglés?   Oralee Andrea a [email or web address] Elo Naima D385 en la búsqueda para aprender Kaitlyn Nicole de \"Dolor de espalda: Instrucciones de cuidado - [ Back Pain: Care Instructions ]. \"  Revisado: 20 septiembre, 2018  Versión del contenido: 12.1  © 9450-2205 Healthwise, "[employer]. Las instrucciones de cuidado fueron adaptadas bajo licencia por [employer] (which disclaims liability or warranty for this information). Si usted tiene [place] afección médica o sobre estas instrucciones, siempre pregunte a mondragon profesional de jordy. [place], "[employer] niega toda garantía o responsabilidad por mondragon uso de esta información.

## 2021-03-25 ENCOUNTER — APPOINTMENT (OUTPATIENT)
Dept: CT IMAGING | Age: 46
End: 2021-03-25
Attending: EMERGENCY MEDICINE

## 2021-03-25 ENCOUNTER — HOSPITAL ENCOUNTER (EMERGENCY)
Age: 46
Discharge: HOME | End: 2021-03-25
Attending: EMERGENCY MEDICINE

## 2021-03-25 VITALS
DIASTOLIC BLOOD PRESSURE: 79 MMHG | HEIGHT: 66 IN | OXYGEN SATURATION: 99 % | WEIGHT: 191 LBS | RESPIRATION RATE: 20 BRPM | TEMPERATURE: 98.2 F | BODY MASS INDEX: 30.7 KG/M2 | HEART RATE: 85 BPM | SYSTOLIC BLOOD PRESSURE: 110 MMHG

## 2021-03-25 DIAGNOSIS — R10.31 ABDOMINAL PAIN, RIGHT LOWER QUADRANT: Primary | ICD-10-CM

## 2021-03-25 DIAGNOSIS — M79.671 PAIN IN BOTH FEET: ICD-10-CM

## 2021-03-25 DIAGNOSIS — M79.602 ARM PAIN, DIFFUSE, LEFT: ICD-10-CM

## 2021-03-25 DIAGNOSIS — M79.672 PAIN IN BOTH FEET: ICD-10-CM

## 2021-03-25 LAB
ALBUMIN SERPL-MCNC: 3.7 G/DL (ref 3.4–5)
ALBUMIN/GLOB SERPL: 1.1 {RATIO} (ref 0.8–1.7)
ALP SERPL-CCNC: 106 U/L (ref 45–117)
ALT SERPL-CCNC: 25 U/L (ref 13–56)
ANION GAP SERPL CALC-SCNC: 5 MMOL/L (ref 3–18)
APPEARANCE UR: CLEAR
AST SERPL-CCNC: 18 U/L (ref 10–38)
BACTERIA URNS QL MICRO: NEGATIVE /HPF
BASOPHILS # BLD: 0 K/UL (ref 0–0.1)
BASOPHILS NFR BLD: 0 % (ref 0–2)
BILIRUB SERPL-MCNC: 0.4 MG/DL (ref 0.2–1)
BILIRUB UR QL: NEGATIVE
BUN SERPL-MCNC: 14 MG/DL (ref 7–18)
BUN/CREAT SERPL: 20 (ref 12–20)
CALCIUM SERPL-MCNC: 8.5 MG/DL (ref 8.5–10.1)
CHLORIDE SERPL-SCNC: 108 MMOL/L (ref 100–111)
CO2 SERPL-SCNC: 27 MMOL/L (ref 21–32)
COLOR UR: YELLOW
CREAT SERPL-MCNC: 0.71 MG/DL (ref 0.6–1.3)
DIFFERENTIAL METHOD BLD: ABNORMAL
EOSINOPHIL # BLD: 0.2 K/UL (ref 0–0.4)
EOSINOPHIL NFR BLD: 4 % (ref 0–5)
EPITH CASTS URNS QL MICRO: NORMAL /LPF (ref 0–5)
ERYTHROCYTE [DISTWIDTH] IN BLOOD BY AUTOMATED COUNT: 15.6 % (ref 11.6–14.5)
GLOBULIN SER CALC-MCNC: 3.3 G/DL (ref 2–4)
GLUCOSE SERPL-MCNC: 100 MG/DL (ref 74–99)
GLUCOSE UR STRIP.AUTO-MCNC: NEGATIVE MG/DL
HCG UR QL: NEGATIVE
HCT VFR BLD AUTO: 34.6 % (ref 35–45)
HGB BLD-MCNC: 10.6 G/DL (ref 12–16)
HGB UR QL STRIP: NEGATIVE
KETONES UR QL STRIP.AUTO: NEGATIVE MG/DL
LEUKOCYTE ESTERASE UR QL STRIP.AUTO: ABNORMAL
LIPASE SERPL-CCNC: 100 U/L (ref 73–393)
LYMPHOCYTES # BLD: 1.4 K/UL (ref 0.9–3.6)
LYMPHOCYTES NFR BLD: 25 % (ref 21–52)
MCH RBC QN AUTO: 25.4 PG (ref 24–34)
MCHC RBC AUTO-ENTMCNC: 30.6 G/DL (ref 31–37)
MCV RBC AUTO: 83 FL (ref 74–97)
MONOCYTES # BLD: 0.3 K/UL (ref 0.05–1.2)
MONOCYTES NFR BLD: 6 % (ref 3–10)
NEUTS SEG # BLD: 3.7 K/UL (ref 1.8–8)
NEUTS SEG NFR BLD: 65 % (ref 40–73)
NITRITE UR QL STRIP.AUTO: NEGATIVE
PH UR STRIP: 5.5 [PH] (ref 5–8)
PLATELET # BLD AUTO: 303 K/UL (ref 135–420)
PMV BLD AUTO: 10 FL (ref 9.2–11.8)
POTASSIUM SERPL-SCNC: 4.1 MMOL/L (ref 3.5–5.5)
PROT SERPL-MCNC: 7 G/DL (ref 6.4–8.2)
PROT UR STRIP-MCNC: NEGATIVE MG/DL
RBC # BLD AUTO: 4.17 M/UL (ref 4.2–5.3)
RBC #/AREA URNS HPF: NORMAL /HPF (ref 0–5)
SERVICE CMNT-IMP: NORMAL
SODIUM SERPL-SCNC: 140 MMOL/L (ref 136–145)
SP GR UR REFRACTOMETRY: 1.03 (ref 1–1.03)
UROBILINOGEN UR QL STRIP.AUTO: 0.2 EU/DL (ref 0.2–1)
WBC # BLD AUTO: 5.7 K/UL (ref 4.6–13.2)
WBC URNS QL MICRO: NORMAL /HPF (ref 0–4)
WET PREP GENITAL: NORMAL

## 2021-03-25 PROCEDURE — 87210 SMEAR WET MOUNT SALINE/INK: CPT

## 2021-03-25 PROCEDURE — 81001 URINALYSIS AUTO W/SCOPE: CPT

## 2021-03-25 PROCEDURE — 74011000636 HC RX REV CODE- 636: Performed by: EMERGENCY MEDICINE

## 2021-03-25 PROCEDURE — 74011250637 HC RX REV CODE- 250/637: Performed by: EMERGENCY MEDICINE

## 2021-03-25 PROCEDURE — 87491 CHLMYD TRACH DNA AMP PROBE: CPT

## 2021-03-25 PROCEDURE — 83690 ASSAY OF LIPASE: CPT

## 2021-03-25 PROCEDURE — 96372 THER/PROPH/DIAG INJ SC/IM: CPT

## 2021-03-25 PROCEDURE — 96374 THER/PROPH/DIAG INJ IV PUSH: CPT

## 2021-03-25 PROCEDURE — 99284 EMERGENCY DEPT VISIT MOD MDM: CPT

## 2021-03-25 PROCEDURE — 96375 TX/PRO/DX INJ NEW DRUG ADDON: CPT

## 2021-03-25 PROCEDURE — 80053 COMPREHEN METABOLIC PANEL: CPT

## 2021-03-25 PROCEDURE — 85025 COMPLETE CBC W/AUTO DIFF WBC: CPT

## 2021-03-25 PROCEDURE — 74011250636 HC RX REV CODE- 250/636: Performed by: EMERGENCY MEDICINE

## 2021-03-25 PROCEDURE — 74177 CT ABD & PELVIS W/CONTRAST: CPT

## 2021-03-25 PROCEDURE — 74011000250 HC RX REV CODE- 250: Performed by: EMERGENCY MEDICINE

## 2021-03-25 PROCEDURE — 81025 URINE PREGNANCY TEST: CPT

## 2021-03-25 RX ORDER — NAPROXEN 375 MG/1
375 TABLET ORAL 2 TIMES DAILY WITH MEALS
Qty: 10 TAB | Refills: 0 | Status: SHIPPED | OUTPATIENT
Start: 2021-03-25 | End: 2021-03-30

## 2021-03-25 RX ORDER — MORPHINE SULFATE 2 MG/ML
2 INJECTION, SOLUTION INTRAMUSCULAR; INTRAVENOUS
Status: COMPLETED | OUTPATIENT
Start: 2021-03-25 | End: 2021-03-25

## 2021-03-25 RX ORDER — LIDOCAINE 4 G/100G
1 PATCH TOPICAL EVERY 24 HOURS
Status: DISCONTINUED | OUTPATIENT
Start: 2021-03-25 | End: 2021-03-26 | Stop reason: HOSPADM

## 2021-03-25 RX ORDER — HYDROMORPHONE HYDROCHLORIDE 1 MG/ML
0.5 INJECTION, SOLUTION INTRAMUSCULAR; INTRAVENOUS; SUBCUTANEOUS
Status: COMPLETED | OUTPATIENT
Start: 2021-03-25 | End: 2021-03-25

## 2021-03-25 RX ORDER — MORPHINE SULFATE 4 MG/ML
4 INJECTION, SOLUTION INTRAMUSCULAR; INTRAVENOUS
Status: DISCONTINUED | OUTPATIENT
Start: 2021-03-25 | End: 2021-03-25

## 2021-03-25 RX ORDER — GUAIFENESIN 100 MG/5ML
81 LIQUID (ML) ORAL DAILY
COMMUNITY

## 2021-03-25 RX ORDER — KETOROLAC TROMETHAMINE 30 MG/ML
60 INJECTION, SOLUTION INTRAMUSCULAR; INTRAVENOUS
Status: COMPLETED | OUTPATIENT
Start: 2021-03-25 | End: 2021-03-25

## 2021-03-25 RX ORDER — DIPHENHYDRAMINE HYDROCHLORIDE 50 MG/ML
25 INJECTION, SOLUTION INTRAMUSCULAR; INTRAVENOUS
Status: COMPLETED | OUTPATIENT
Start: 2021-03-25 | End: 2021-03-25

## 2021-03-25 RX ORDER — TIZANIDINE HYDROCHLORIDE 2 MG/1
2 CAPSULE, GELATIN COATED ORAL
Qty: 10 CAP | Refills: 0 | Status: SHIPPED | OUTPATIENT
Start: 2021-03-25 | End: 2021-03-28

## 2021-03-25 RX ORDER — DICYCLOMINE HYDROCHLORIDE 10 MG/1
10 CAPSULE ORAL 3 TIMES DAILY
Qty: 15 CAP | Refills: 0 | Status: SHIPPED | OUTPATIENT
Start: 2021-03-25 | End: 2021-03-30

## 2021-03-25 RX ORDER — TIZANIDINE 4 MG/1
4 TABLET ORAL
Status: COMPLETED | OUTPATIENT
Start: 2021-03-25 | End: 2021-03-25

## 2021-03-25 RX ORDER — METOCLOPRAMIDE HYDROCHLORIDE 5 MG/ML
5 INJECTION INTRAMUSCULAR; INTRAVENOUS ONCE
Status: COMPLETED | OUTPATIENT
Start: 2021-03-25 | End: 2021-03-25

## 2021-03-25 RX ADMIN — KETOROLAC TROMETHAMINE 60 MG: 30 INJECTION, SOLUTION INTRAMUSCULAR at 19:27

## 2021-03-25 RX ADMIN — METOCLOPRAMIDE 5 MG: 5 INJECTION, SOLUTION INTRAMUSCULAR; INTRAVENOUS at 23:11

## 2021-03-25 RX ADMIN — DIPHENHYDRAMINE HYDROCHLORIDE 25 MG: 50 INJECTION, SOLUTION INTRAMUSCULAR; INTRAVENOUS at 23:11

## 2021-03-25 RX ADMIN — SODIUM CHLORIDE 1000 ML: 9 INJECTION, SOLUTION INTRAVENOUS at 21:56

## 2021-03-25 RX ADMIN — HYDROMORPHONE HYDROCHLORIDE 0.5 MG: 1 INJECTION, SOLUTION INTRAMUSCULAR; INTRAVENOUS; SUBCUTANEOUS at 23:11

## 2021-03-25 RX ADMIN — MORPHINE SULFATE 2 MG: 2 INJECTION, SOLUTION INTRAMUSCULAR; INTRAVENOUS at 21:57

## 2021-03-25 RX ADMIN — TIZANIDINE 4 MG: 4 TABLET ORAL at 19:28

## 2021-03-25 RX ADMIN — IOPAMIDOL 98 ML: 612 INJECTION, SOLUTION INTRAVENOUS at 22:57

## 2021-03-25 RX ADMIN — MORPHINE SULFATE 2 MG: 2 INJECTION, SOLUTION INTRAMUSCULAR; INTRAVENOUS at 21:56

## 2021-03-25 NOTE — ED PROVIDER NOTES
EMERGENCY DEPARTMENT HISTORY AND PHYSICAL EXAM    6:56 PM    Date: 3/25/2021  Patient Name: Srinivasa Pimentel    History of Presenting Illness     Chief Complaint   Patient presents with    Headache    Urinary Pain    Arm Pain       History Provided By: Patient and Patient's Daughter  Location/Duration/Severity/Modifying factors   Patient is a 43-year-old female with past medical history of cholecystectomy, , thyroid issue presenting to the emergency department chief complaint of multiple issues. Patient is Macedonian-speaking.  was recommended however patient refuses and her family member at bedside provides translation. The symptoms have been going on for 8 days. Pain is located on the left side of her head rating to the left shoulder. Worsens with any movement of the arm. Also complains of burning with urination lower abdominal discomfort. Patient indicates that all the symptoms seem to start simultaneously. Also complains of bilateral foot pain. No vomiting, diarrhea, fevers or chills. No chest pain or shortness of breath no vaginal bleeding or discharge. 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 Later a certified  was utilized to clarify the history, after discussion with patient it was important that no history was missed. .  Patient reports that she has had the pain on her left side since she had the accident in July, she reports that extends from the left side of her neck into her left upper extremity and into her left leg. .  The abdominal pain is new, it is generalized and not isolated to just urination. Patient with no concerns for STDs, gonorrhea or chlamydia. All of her symptoms as well as her bilateral foot pain seem to have worsened in the past week. No nausea, vomiting, diarrhea. No vaginal bleeding but has had some white vaginal discharge.   She has not tried anything at home so far.          PCP: None        Past History     Past Medical History:  History reviewed. No pertinent past medical history. Past Surgical History:  Past Surgical History:   Procedure Laterality Date    HX  SECTION      HX CHOLECYSTECTOMY         Family History:  History reviewed. No pertinent family history. Social History:  Social History     Tobacco Use    Smoking status: Never Smoker    Smokeless tobacco: Never Used   Substance Use Topics    Alcohol use: No    Drug use: No       Allergies:  No Known Allergies    I reviewed and confirmed the above information with patient and updated as necessary. Review of Systems     Review of Systems   Constitutional: Negative for chills and fever. HENT: Negative for congestion, rhinorrhea, sinus pressure and sneezing. Eyes: Negative for visual disturbance. Respiratory: Negative for cough and shortness of breath. Cardiovascular: Negative for chest pain. Gastrointestinal: Positive for abdominal pain. Negative for diarrhea, nausea and vomiting. Genitourinary: Positive for difficulty urinating, dysuria and vaginal discharge. Negative for frequency and urgency. Musculoskeletal: Negative for back pain and neck pain. Skin: Negative for rash. Neurological: Negative for syncope, numbness and headaches. Physical Exam     Visit Vitals  /79   Pulse 85   Temp 98.2 °F (36.8 °C)   Resp 20   Ht 5' 6\" (1.676 m)   Wt 86.6 kg (191 lb)   LMP 2021 (Approximate)   SpO2 99%   BMI 30.83 kg/m²       Physical Exam  Vitals signs and nursing note reviewed. Constitutional:       General: She is not in acute distress. Appearance: Normal appearance. She is normal weight. She is not ill-appearing. Comments: Uncomfortable appearing, tearful but nontoxic. HENT:      Head: Normocephalic and atraumatic.       Right Ear: External ear normal.      Left Ear: External ear normal.      Nose: Nose normal.      Mouth/Throat:      Mouth: Mucous membranes are moist.   Eyes:      Conjunctiva/sclera: Conjunctivae normal.   Neck:      Musculoskeletal: Normal range of motion and neck supple. Cardiovascular:      Rate and Rhythm: Normal rate and regular rhythm. Pulses: Normal pulses. Heart sounds: Normal heart sounds. No murmur. Pulmonary:      Effort: Pulmonary effort is normal.      Breath sounds: Normal breath sounds. No wheezing, rhonchi or rales. Abdominal:      General: Abdomen is flat. Tenderness: There is abdominal tenderness (Generalized, subjective). There is no guarding or rebound. Musculoskeletal: Normal range of motion. General: No swelling. Right lower leg: No edema. Left lower leg: No edema. Comments: Palpable muscular tenderness elicited in the left trapezius musculature, extending to the left deltoid. Tenderness elicited to the supraspinatus musculature. Patient has equal  strength. Bilateral feet are tender on palpation of the plantar fascia. No induration, fluctuance or erythema. Skin:     General: Skin is warm and dry. Capillary Refill: Capillary refill takes less than 2 seconds. Findings: No rash. Neurological:      General: No focal deficit present. Mental Status: She is alert. Comments: Cranial nerves II through XII are intact. Symmetric and equal strength in the upper and lower extremities bilaterally. Alert and oriented x4.          Diagnostic Study Results     Labs -  Recent Results (from the past 24 hour(s))   URINALYSIS W/ RFLX MICROSCOPIC    Collection Time: 03/25/21  7:00 PM   Result Value Ref Range    Color YELLOW      Appearance CLEAR      Specific gravity 1.028 1.005 - 1.030      pH (UA) 5.5 5.0 - 8.0      Protein Negative NEG mg/dL    Glucose Negative NEG mg/dL    Ketone Negative NEG mg/dL    Bilirubin Negative NEG      Blood Negative NEG      Urobilinogen 0.2 0.2 - 1.0 EU/dL    Nitrites Negative NEG      Leukocyte Esterase SMALL (A) NEG     URINE MICROSCOPIC ONLY    Collection Time: 03/25/21  7:00 PM   Result Value Ref Range    WBC 0 to 3 0 - 4 /hpf    RBC 2 to 4 0 - 5 /hpf    Epithelial cells 2+ 0 - 5 /lpf    Bacteria Negative NEG /hpf   HCG URINE, QL    Collection Time: 03/25/21  7:00 PM   Result Value Ref Range    HCG urine, QL Negative NEG     WET PREP    Collection Time: 03/25/21  8:30 PM    Specimen: Vaginal Specimen   Result Value Ref Range    Special Requests: NO SPECIAL REQUESTS      Wet prep CLUE CELLS ABSENT      Wet prep NO FUNGAL ELEMENTS SEEN      Wet prep NO TRICHOMONAS SEEN     CBC WITH AUTOMATED DIFF    Collection Time: 03/25/21  9:45 PM   Result Value Ref Range    WBC 5.7 4.6 - 13.2 K/uL    RBC 4.17 (L) 4.20 - 5.30 M/uL    HGB 10.6 (L) 12.0 - 16.0 g/dL    HCT 34.6 (L) 35.0 - 45.0 %    MCV 83.0 74.0 - 97.0 FL    MCH 25.4 24.0 - 34.0 PG    MCHC 30.6 (L) 31.0 - 37.0 g/dL    RDW 15.6 (H) 11.6 - 14.5 %    PLATELET 123 042 - 408 K/uL    MPV 10.0 9.2 - 11.8 FL    NEUTROPHILS 65 40 - 73 %    LYMPHOCYTES 25 21 - 52 %    MONOCYTES 6 3 - 10 %    EOSINOPHILS 4 0 - 5 %    BASOPHILS 0 0 - 2 %    ABS. NEUTROPHILS 3.7 1.8 - 8.0 K/UL    ABS. LYMPHOCYTES 1.4 0.9 - 3.6 K/UL    ABS. MONOCYTES 0.3 0.05 - 1.2 K/UL    ABS. EOSINOPHILS 0.2 0.0 - 0.4 K/UL    ABS. BASOPHILS 0.0 0.0 - 0.1 K/UL    DF AUTOMATED     METABOLIC PANEL, COMPREHENSIVE    Collection Time: 03/25/21  9:45 PM   Result Value Ref Range    Sodium 140 136 - 145 mmol/L    Potassium 4.1 3.5 - 5.5 mmol/L    Chloride 108 100 - 111 mmol/L    CO2 27 21 - 32 mmol/L    Anion gap 5 3.0 - 18 mmol/L    Glucose 100 (H) 74 - 99 mg/dL    BUN 14 7.0 - 18 MG/DL    Creatinine 0.71 0.6 - 1.3 MG/DL    BUN/Creatinine ratio 20 12 - 20      GFR est AA >60 >60 ml/min/1.73m2    GFR est non-AA >60 >60 ml/min/1.73m2    Calcium 8.5 8.5 - 10.1 MG/DL    Bilirubin, total 0.4 0.2 - 1.0 MG/DL    ALT (SGPT) 25 13 - 56 U/L    AST (SGOT) 18 10 - 38 U/L    Alk.  phosphatase 106 45 - 117 U/L    Protein, total 7.0 6.4 - 8.2 g/dL Albumin 3.7 3.4 - 5.0 g/dL    Globulin 3.3 2.0 - 4.0 g/dL    A-G Ratio 1.1 0.8 - 1.7     LIPASE    Collection Time: 03/25/21  9:45 PM   Result Value Ref Range    Lipase 100 73 - 393 U/L         Radiologic Studies -   CT ABD PELV W CONT   Final Result      No acute or focal abnormality to explain patient's abdominal pain. Initial preliminary report was provided to the ED by the on-call radiology   resident. Medical Decision Making   I am the first provider for this patient. I reviewed the vital signs, available nursing notes, past medical history, past surgical history, family history and social history. Vital Signs-Reviewed the patient's vital signs. Records Reviewed: Nursing Notes, Old Medical Records, Previous Radiology Studies and Previous Laboratory Studies (Time of Review: 6:56 PM)    ED Course: Progress Notes, Reevaluation, and Consults:  ED Course as of Mar 26 1111   Thu Mar 25, 2021   2000 Patient was reassessed, indicating that she has had some vaginal discharge will send for wet prep. [GLADYS]      ED Course User Index  [GLADYS] Olman June, DO         Provider Notes (Medical Decision Making):   MDM  Number of Diagnoses or Management Options  Abdominal pain, right lower quadrant  Arm pain, diffuse, left  Pain in both feet  Diagnosis management comments: 80-year-old female presents to the emergency department with multiple complaints. On exam she is tenderness to the left scapular musculature trapezius and supraspinatus. Suspect this is likely a headache which is due to muscle tension. Doubt subarachnoid hemorrhage, subdural hematoma or other more serious cause of headache. No fever or meningismus. Bilateral foot pain which is suspect is plantar fasciitis. Given bilaterality and chronic nature no injury no need for x-rays. Will check a urine to rule out UTI, cystitis etc.    Patient later began complaining of more abdominal pain.   At that point advised the patient that a  must be used, as her daughter providing translation was not resulting in good communication. History was clarified as discussed in the HPI. Labs ordered and CT scan ordered to rule out appendicitis, diverticulitis, pancreatitis, bowel obstruction etc.  Pain is located throughout the abdomen, indicating prominence in the mid abdomen right upper quadrant. Patient is status post cholecystectomy. Low suspicion for torsion given poorly localized pain, duration of 8 days makes this likewise unlikely. Her urine does not show clear evidence of UTI. This was later clarified as well the patient reports that her urine feels warm as opposed to painful when she urinates. Regarding her extremity pain, this seems to be a bit of a chronic issue. Consider process such as CRPS given the posttraumatic nature. Certainly this can be diagnosed by me in the ER but she needs primary care follow-up for this. Her work-up is unremarkable CT does not show any acute process other than some possible physiologic fluid in the pelvis. Conceivably could be from a recently ruptured cyst however no significant cyst seen on CAT scan. We will discharge her home with Bentyl, Zanaflex and naproxen. I will give her several primary care resources to initiate follow-up for this and other general health maintenance. Recommended return for any worsening in her condition.     At this time, patient is stable and appropriate for discharge home.  Patient demonstrates understanding of current diagnoses and is in agreement with the treatment plan. Calin Cheung are advised that while the likelihood of serious underlying condition is low at this point given the evaluation performed today, we cannot fully rule it out. Calin Cheung are advised to immediately return with any new symptoms or worsening of current condition.  All questions have been answered. [facility] is given educational material regarding their diagnoses, including danger symptoms and when to return to the ED. This note was dictated utilizing Dragon voice recognition software. Unfortunately this leads to occasional typographical errors. I apologize in advance if the situation occurs. If questions occur please do not hesitate to contact me directly. Josy Ryan,           Procedures    Critical Care Time: 0    Diagnosis     Clinical Impression:   1. Abdominal pain, right lower quadrant    2. Pain in both feet    3. Arm pain, diffuse, left        Disposition: Discharge    Follow-up Information     Follow up With Specialties Details Why [address] [facility] ([address])  In 3 days [address] [facility] [id number]  [phone]    [address]  In 3 days [address]   [address]    Skye Kearney MD Family Medicine, Internal Medicine In 3 days Primary Care Provider follow up [id number] [facility]  [address] [place] [address] [facility]      Terry West MD Internal Medicine In 3 days  [facility] [address] [facility] Internal Medicine [address] [place] [address]  [phone]          Discharge Medication List as of 3/25/2021 11:34 PM      START taking these medications    Details   tiZANidine (ZANAFLEX) 2 mg capsule Take 1 Cap by mouth three (3) times daily as needed for Pain for up to 3 days. , Print, Disp-10 Cap, R-0      naproxen (NAPROSYN) 375 mg tablet Take 1 Tab by mouth two (2) times daily (with meals) for 5 days. , Print, Disp-10 Tab, R-0      dicyclomine (BENTYL) 10 mg capsule Take 1 Cap by mouth three (3) times daily for 5 days. , Print, Disp-15 Cap, R-0         CONTINUE these medications which have NOT CHANGED    Details   aspirin 81 mg chewable tablet Take 81 mg by mouth daily. , 375 Hal Izaguirre DO   Emergency Medicine   March 26, 2021, 6:56 PM     This note is dictated utilizing Dragon voice recognition software. Unfortunately this leads to occasional typographical errors using the voice recognition. I apologize in advance if the situation occurs. If questions occur please do not hesitate to contact me directly.     Tevin Fox, DO

## 2021-03-25 NOTE — ED NOTES
Patient resting quietly on stretcher- stretcher in low position and locked. Daughter at bedside. Patient denies any acute distress at this time.  Patient update given to patient

## 2021-03-25 NOTE — Clinical Note
[address] [facility] EMERGENCY DEPT  [address] [facility] [id number] [phone]    Work/School Note    Date: 3/25/2021    To Whom It May concern:    Shital Harmon was seen and treated today in the emergency room by the following provider(s):  Attending Provider: Titi Padgett is excused from work/school on 3/25/2021 through 3/28/2021. She is medically clear to return to work/school on 3/29/2021.         Sincerely,          Dayna Bartholomew, DO

## 2021-03-25 NOTE — ED TRIAGE NOTES
Pt arrives with c/o headache, left arm pain and burning with urination. Pt's daughter present to translate. Per daughter pt had a fall in the past and the pain is from this.

## 2021-03-26 NOTE — ED NOTES
Patient resting quietly at this time- patient prior was stating that mid epigastric pain worse- now patient states improved

## 2021-03-29 LAB
C TRACH RRNA SPEC QL NAA+PROBE: NEGATIVE
N GONORRHOEA RRNA SPEC QL NAA+PROBE: NEGATIVE
SPECIMEN SOURCE: NORMAL

## 2023-07-23 NOTE — DISCHARGE INSTRUCTIONS
Quiste de la glándula de Yesenia: Instrucciones de cuidado - [ Bartholin Gland Cyst: Care Instructions ]  Instrucciones de cuidado    Las glándulas de Yesenia se encuentran en la vulva de Alexis. Esta es la roberto que rodea a la vagina. Las glándulas suelen ser del tamaño de james  Jf (chícharo). Le suministran líquido a la roberto vulvar por medio de james abertura pequeña. Si la abertura se obstruye, la glándula se hincha con líquido y se forma un quiste. Puede tener un quiste por varios años sin que se presenten síntomas. Jai si un quiste se infecta con bacterias, puede crecer, enrojecer y causar dolor. [place] se llama absceso. Por lo general, las infecciones se [facility][1]. Es posible que haya tenido puesto un pequeño tubo de drenaje en el quiste o que le hayan hecho james cirugía gladys para permitir que el quiste drene. El tubo suele dejarse colocado por lo menos 4 semanas. Mondragon médico puede hacerle james prueba de laboratorio para identificar las bacterias que causaron la infección. Puede amilcar antibióticos para eliminar las bacterias. Es posible que el quiste siga drenando azalia algunas semanas. Cuando ceda la infección, la glándula debería recobrar mondragon estado normal.  La atención de seguimiento es james parte clave de mondragon tratamiento y seguridad. Asegúrese de hacer y acudir a todas las citas, y llame a mondragon médico si está teniendo problemas. También es james buena idea saber los resultados de los exámenes y mantener james lista de los medicamentos que thom. ¿Cómo puede cuidarse en el hogar? · Si mondragon médico le recetó antibióticos, tómelos según las indicaciones. No deje de tomarlos por el hecho de sentirse mejor. Debe amilcar todos los antibióticos hasta terminarlos. · Siéntese en unas pocas pulgadas de agua tibia (baño de asiento) 3 veces al día y después de evacuar el intestino. El agua tibia ayuda a sanar la roberto y [employer] [facility].   · [place] un analgésico (medicamento para el dolor) de venta greer james acetaminofén (Tylenol), ibuprofeno (Advil, Motrin) o naproxeno (Aleve). Alisson y siga todas las instrucciones de la [place]. · No tome dos o más analgésicos al mismo tiempo a menos que mondragon médico se lo haya indicado. Muchos analgésicos contienen acetaminofén, es decir, Tylenol. El exceso de acetaminofén (Tylenol) puede ser dañino. · Use protectores diarios o toallitas sanitarias si tiene secreción que drena del quiste. · Si está activa sexualmente, evite las relaciones sexuales hasta que:  ¨ Haya terminado los antibióticos. ¨ La roberto haya sanado. · Si tuvo un tubo de drenaje colocado en el quiste para ayudarlo a drenar, siga las instrucciones de mondragon médico sobre actividades hasta que le saquen el tubo. ¿Cuándo debe pedir ayuda? Llame a mondragon médico ahora mismo o busque atención médica inmediata si:  ? · El dolor [facility]. ? · Tiene fiebre nueva o más alize. ? · La hinchazón aumenta. ? · La roberto enrojecida que rodea al [employer]. ?Preste especial atención a los cambios en mondragon jordy y asegúrese de comunicarse con mondragon médico si:  ? · Se  el tubo de drenaje. ? · Jeaneth síntomas no mejoran en 2 días. ¿Dónde puede encontrar más información en inglés? Suhail Byrne a http://danielle-shelley.info/. Escriba H276 en la búsqueda para aprender más acerca de \"Quiste de la glándula de Bartolino: Instrucciones de cuidado - [ Bartholin Gland Cyst: Care Instructions ]. \"  Revisado:  2016  Versión del contenido: 11.4  © 6708-9765 [employer]wise, [employer]. Las instrucciones de cuidado fueron adaptadas bajo licencia por [employer] (which disclaims liability or warranty for this information). Si usted tiene [place] afección médica o sobre estas instrucciones, siempre pregunte a mondragon profesional de jordy. [employer]brain, [employer] niega toda garantía o responsabilidad por mondragon uso de esta información. No